# Patient Record
Sex: FEMALE | Race: WHITE | NOT HISPANIC OR LATINO | Employment: FULL TIME | URBAN - METROPOLITAN AREA
[De-identification: names, ages, dates, MRNs, and addresses within clinical notes are randomized per-mention and may not be internally consistent; named-entity substitution may affect disease eponyms.]

---

## 2017-01-10 ENCOUNTER — COMMUNICATION - HEALTHEAST (OUTPATIENT)
Dept: RHEUMATOLOGY | Facility: CLINIC | Age: 47
End: 2017-01-10

## 2017-01-10 DIAGNOSIS — M06.9 RHEUMATOID ARTHRITIS INVOLVING MULTIPLE JOINTS (H): ICD-10-CM

## 2017-02-04 ENCOUNTER — COMMUNICATION - HEALTHEAST (OUTPATIENT)
Dept: RHEUMATOLOGY | Facility: CLINIC | Age: 47
End: 2017-02-04

## 2017-02-04 DIAGNOSIS — M06.9 RHEUMATOID ARTHRITIS INVOLVING MULTIPLE JOINTS (H): ICD-10-CM

## 2017-02-04 DIAGNOSIS — M25.50 ARTHRALGIA: ICD-10-CM

## 2017-03-23 ENCOUNTER — AMBULATORY - HEALTHEAST (OUTPATIENT)
Dept: LAB | Facility: CLINIC | Age: 47
End: 2017-03-23

## 2017-03-23 DIAGNOSIS — Z79.899 HIGH RISK MEDICATION USE: ICD-10-CM

## 2017-03-23 LAB
ALT SERPL W P-5'-P-CCNC: 42 U/L (ref 0–45)
CREAT SERPL-MCNC: 0.72 MG/DL (ref 0.6–1.1)
GFR SERPL CREATININE-BSD FRML MDRD: >60 ML/MIN/1.73M2

## 2017-03-26 ENCOUNTER — COMMUNICATION - HEALTHEAST (OUTPATIENT)
Dept: RHEUMATOLOGY | Facility: CLINIC | Age: 47
End: 2017-03-26

## 2017-03-27 ENCOUNTER — OFFICE VISIT - HEALTHEAST (OUTPATIENT)
Dept: RHEUMATOLOGY | Facility: CLINIC | Age: 47
End: 2017-03-27

## 2017-03-27 DIAGNOSIS — M25.50 ARTHRALGIA: ICD-10-CM

## 2017-03-27 DIAGNOSIS — M06.4 INFLAMMATORY POLYARTHRITIS (H): ICD-10-CM

## 2017-03-27 DIAGNOSIS — Z79.899 HIGH RISK MEDICATION USE: ICD-10-CM

## 2017-03-27 ASSESSMENT — MIFFLIN-ST. JEOR: SCORE: 1581.34

## 2017-06-14 ENCOUNTER — COMMUNICATION - HEALTHEAST (OUTPATIENT)
Dept: RHEUMATOLOGY | Facility: CLINIC | Age: 47
End: 2017-06-14

## 2017-06-20 ENCOUNTER — COMMUNICATION - HEALTHEAST (OUTPATIENT)
Dept: RHEUMATOLOGY | Facility: CLINIC | Age: 47
End: 2017-06-20

## 2017-06-20 DIAGNOSIS — M06.4 INFLAMMATORY POLYARTHRITIS (H): ICD-10-CM

## 2017-06-27 ENCOUNTER — AMBULATORY - HEALTHEAST (OUTPATIENT)
Dept: LAB | Facility: CLINIC | Age: 47
End: 2017-06-27

## 2017-06-27 DIAGNOSIS — Z79.899 HIGH RISK MEDICATION USE: ICD-10-CM

## 2017-06-27 LAB
ALT SERPL W P-5'-P-CCNC: 22 U/L (ref 0–45)
CREAT SERPL-MCNC: 0.76 MG/DL (ref 0.6–1.1)
GFR SERPL CREATININE-BSD FRML MDRD: >60 ML/MIN/1.73M2

## 2017-07-04 ENCOUNTER — COMMUNICATION - HEALTHEAST (OUTPATIENT)
Dept: RHEUMATOLOGY | Facility: CLINIC | Age: 47
End: 2017-07-04

## 2017-07-05 ENCOUNTER — OFFICE VISIT - HEALTHEAST (OUTPATIENT)
Dept: RHEUMATOLOGY | Facility: CLINIC | Age: 47
End: 2017-07-05

## 2017-07-05 ENCOUNTER — COMMUNICATION - HEALTHEAST (OUTPATIENT)
Dept: ADMINISTRATIVE | Facility: CLINIC | Age: 47
End: 2017-07-05

## 2017-07-05 DIAGNOSIS — Z79.899 HIGH RISK MEDICATION USE: ICD-10-CM

## 2017-07-05 DIAGNOSIS — M54.40 ACUTE BILATERAL LOW BACK PAIN WITH SCIATICA: ICD-10-CM

## 2017-07-05 DIAGNOSIS — M06.4 INFLAMMATORY POLYARTHRITIS (H): ICD-10-CM

## 2017-07-05 ASSESSMENT — MIFFLIN-ST. JEOR: SCORE: 1580.43

## 2017-08-07 ENCOUNTER — COMMUNICATION - HEALTHEAST (OUTPATIENT)
Dept: RHEUMATOLOGY | Facility: CLINIC | Age: 47
End: 2017-08-07

## 2017-08-07 DIAGNOSIS — M06.4 INFLAMMATORY POLYARTHRITIS (H): ICD-10-CM

## 2017-08-28 ENCOUNTER — AMBULATORY - HEALTHEAST (OUTPATIENT)
Dept: LAB | Facility: CLINIC | Age: 47
End: 2017-08-28

## 2017-08-28 DIAGNOSIS — Z79.899 HIGH RISK MEDICATION USE: ICD-10-CM

## 2017-08-28 LAB
ALT SERPL W P-5'-P-CCNC: 45 U/L (ref 0–45)
CREAT SERPL-MCNC: 0.74 MG/DL (ref 0.6–1.1)
GFR SERPL CREATININE-BSD FRML MDRD: >60 ML/MIN/1.73M2

## 2017-08-30 ENCOUNTER — OFFICE VISIT - HEALTHEAST (OUTPATIENT)
Dept: RHEUMATOLOGY | Facility: CLINIC | Age: 47
End: 2017-08-30

## 2017-08-30 ENCOUNTER — RECORDS - HEALTHEAST (OUTPATIENT)
Dept: GENERAL RADIOLOGY | Age: 47
End: 2017-08-30

## 2017-08-30 DIAGNOSIS — Z84.0 FAMILY HISTORY OF PSORIASIS IN SISTER: ICD-10-CM

## 2017-08-30 DIAGNOSIS — M06.4 INFLAMMATORY POLYARTHRITIS (H): ICD-10-CM

## 2017-08-30 DIAGNOSIS — Z84.0 FAMILY HISTORY OF DISEASES OF THE SKIN AND SUBCUTANEOUS TISSUE: ICD-10-CM

## 2017-08-30 DIAGNOSIS — Z79.899 HIGH RISK MEDICATION USE: ICD-10-CM

## 2017-08-30 DIAGNOSIS — M70.61 TROCHANTERIC BURSITIS OF BOTH HIPS: ICD-10-CM

## 2017-08-30 DIAGNOSIS — M70.62 TROCHANTERIC BURSITIS OF BOTH HIPS: ICD-10-CM

## 2017-08-30 DIAGNOSIS — M06.4 INFLAMMATORY POLYARTHROPATHY (H): ICD-10-CM

## 2017-08-30 ASSESSMENT — MIFFLIN-ST. JEOR: SCORE: 1584.97

## 2017-08-31 LAB
ANA SER QL: 0.5 U
HBV SURFACE AG SERPL QL IA: NEGATIVE
HCV AB SERPL QL IA: NEGATIVE

## 2017-09-07 LAB
HLA-B27 RESULT - HISTORICAL: NEGATIVE
INTERPRETATION: NORMAL

## 2017-09-12 ENCOUNTER — COMMUNICATION - HEALTHEAST (OUTPATIENT)
Dept: RHEUMATOLOGY | Facility: CLINIC | Age: 47
End: 2017-09-12

## 2017-09-12 DIAGNOSIS — M06.4 INFLAMMATORY POLYARTHRITIS (H): ICD-10-CM

## 2017-09-20 ENCOUNTER — AMBULATORY - HEALTHEAST (OUTPATIENT)
Dept: LAB | Facility: CLINIC | Age: 47
End: 2017-09-20

## 2017-09-20 DIAGNOSIS — Z79.899 HIGH RISK MEDICATION USE: ICD-10-CM

## 2017-09-20 LAB
ALT SERPL W P-5'-P-CCNC: 20 U/L (ref 0–45)
CREAT SERPL-MCNC: 0.74 MG/DL (ref 0.6–1.1)
GFR SERPL CREATININE-BSD FRML MDRD: >60 ML/MIN/1.73M2

## 2017-09-29 ENCOUNTER — COMMUNICATION - HEALTHEAST (OUTPATIENT)
Dept: RHEUMATOLOGY | Facility: CLINIC | Age: 47
End: 2017-09-29

## 2017-09-29 DIAGNOSIS — M06.4 INFLAMMATORY POLYARTHRITIS (H): ICD-10-CM

## 2017-10-17 ENCOUNTER — OFFICE VISIT - HEALTHEAST (OUTPATIENT)
Dept: RHEUMATOLOGY | Facility: CLINIC | Age: 47
End: 2017-10-17

## 2017-10-17 DIAGNOSIS — Z79.899 HIGH RISK MEDICATION USE: ICD-10-CM

## 2017-10-17 DIAGNOSIS — G57.00 PIRIFORMIS SYNDROME, UNSPECIFIED LATERALITY: ICD-10-CM

## 2017-10-17 DIAGNOSIS — M06.4 INFLAMMATORY POLYARTHRITIS (H): ICD-10-CM

## 2017-10-17 ASSESSMENT — MIFFLIN-ST. JEOR: SCORE: 1580.43

## 2017-11-07 ENCOUNTER — OFFICE VISIT - HEALTHEAST (OUTPATIENT)
Dept: PHYSICAL THERAPY | Facility: REHABILITATION | Age: 47
End: 2017-11-07

## 2017-11-07 DIAGNOSIS — M67.951 TENDINOPATHY OF RIGHT GLUTEUS MEDIUS: ICD-10-CM

## 2017-11-07 DIAGNOSIS — M67.952 TENDINOPATHY OF LEFT GLUTEUS MEDIUS: ICD-10-CM

## 2017-11-22 ENCOUNTER — OFFICE VISIT - HEALTHEAST (OUTPATIENT)
Dept: PHYSICAL THERAPY | Facility: REHABILITATION | Age: 47
End: 2017-11-22

## 2017-11-22 DIAGNOSIS — M67.951 TENDINOPATHY OF RIGHT GLUTEUS MEDIUS: ICD-10-CM

## 2017-11-22 DIAGNOSIS — M67.952 TENDINOPATHY OF LEFT GLUTEUS MEDIUS: ICD-10-CM

## 2017-11-29 ENCOUNTER — OFFICE VISIT - HEALTHEAST (OUTPATIENT)
Dept: PHYSICAL THERAPY | Facility: REHABILITATION | Age: 47
End: 2017-11-29

## 2017-11-29 DIAGNOSIS — M67.951 TENDINOPATHY OF RIGHT GLUTEUS MEDIUS: ICD-10-CM

## 2017-11-29 DIAGNOSIS — M67.952 TENDINOPATHY OF LEFT GLUTEUS MEDIUS: ICD-10-CM

## 2017-12-05 ENCOUNTER — OFFICE VISIT - HEALTHEAST (OUTPATIENT)
Dept: PHYSICAL THERAPY | Facility: REHABILITATION | Age: 47
End: 2017-12-05

## 2017-12-05 ENCOUNTER — AMBULATORY - HEALTHEAST (OUTPATIENT)
Dept: RHEUMATOLOGY | Facility: CLINIC | Age: 47
End: 2017-12-05

## 2017-12-05 ENCOUNTER — COMMUNICATION - HEALTHEAST (OUTPATIENT)
Dept: RHEUMATOLOGY | Facility: CLINIC | Age: 47
End: 2017-12-05

## 2017-12-05 DIAGNOSIS — M67.952 TENDINOPATHY OF LEFT GLUTEUS MEDIUS: ICD-10-CM

## 2017-12-05 DIAGNOSIS — M06.4 INFLAMMATORY POLYARTHRITIS (H): ICD-10-CM

## 2017-12-05 DIAGNOSIS — M67.951 TENDINOPATHY OF RIGHT GLUTEUS MEDIUS: ICD-10-CM

## 2017-12-12 ENCOUNTER — OFFICE VISIT - HEALTHEAST (OUTPATIENT)
Dept: PHYSICAL THERAPY | Facility: REHABILITATION | Age: 47
End: 2017-12-12

## 2017-12-12 DIAGNOSIS — M67.952 TENDINOPATHY OF LEFT GLUTEUS MEDIUS: ICD-10-CM

## 2017-12-12 DIAGNOSIS — M67.951 TENDINOPATHY OF RIGHT GLUTEUS MEDIUS: ICD-10-CM

## 2017-12-21 ENCOUNTER — RECORDS - HEALTHEAST (OUTPATIENT)
Dept: LAB | Facility: CLINIC | Age: 47
End: 2017-12-21

## 2017-12-21 LAB
CHOLEST SERPL-MCNC: 153 MG/DL
FASTING STATUS PATIENT QL REPORTED: YES
HDLC SERPL-MCNC: 49 MG/DL
LDLC SERPL CALC-MCNC: 87 MG/DL
TRIGL SERPL-MCNC: 86 MG/DL

## 2017-12-29 ENCOUNTER — COMMUNICATION - HEALTHEAST (OUTPATIENT)
Dept: RHEUMATOLOGY | Facility: CLINIC | Age: 47
End: 2017-12-29

## 2017-12-29 ENCOUNTER — AMBULATORY - HEALTHEAST (OUTPATIENT)
Dept: LAB | Facility: CLINIC | Age: 47
End: 2017-12-29

## 2017-12-29 DIAGNOSIS — M06.4 INFLAMMATORY POLYARTHRITIS (H): ICD-10-CM

## 2017-12-29 LAB
ALT SERPL W P-5'-P-CCNC: 27 U/L (ref 0–45)
CREAT SERPL-MCNC: 0.72 MG/DL (ref 0.6–1.1)
GFR SERPL CREATININE-BSD FRML MDRD: >60 ML/MIN/1.73M2

## 2018-01-02 ENCOUNTER — COMMUNICATION - HEALTHEAST (OUTPATIENT)
Dept: RHEUMATOLOGY | Facility: CLINIC | Age: 48
End: 2018-01-02

## 2018-01-04 ENCOUNTER — COMMUNICATION - HEALTHEAST (OUTPATIENT)
Dept: RHEUMATOLOGY | Facility: CLINIC | Age: 48
End: 2018-01-04

## 2018-01-05 ENCOUNTER — OFFICE VISIT - HEALTHEAST (OUTPATIENT)
Dept: RHEUMATOLOGY | Facility: CLINIC | Age: 48
End: 2018-01-05

## 2018-01-05 DIAGNOSIS — Z84.0 FAMILY HISTORY OF PSORIASIS IN SISTER: ICD-10-CM

## 2018-01-05 DIAGNOSIS — G57.00 PIRIFORMIS SYNDROME, UNSPECIFIED LATERALITY: ICD-10-CM

## 2018-01-05 DIAGNOSIS — M06.4 INFLAMMATORY POLYARTHRITIS (H): ICD-10-CM

## 2018-01-05 ASSESSMENT — MIFFLIN-ST. JEOR: SCORE: 1580.43

## 2018-01-09 ENCOUNTER — COMMUNICATION - HEALTHEAST (OUTPATIENT)
Dept: RHEUMATOLOGY | Facility: CLINIC | Age: 48
End: 2018-01-09

## 2018-01-12 ENCOUNTER — HOSPITAL ENCOUNTER (OUTPATIENT)
Dept: MRI IMAGING | Facility: CLINIC | Age: 48
Discharge: HOME | End: 2018-01-12
Attending: INTERNAL MEDICINE

## 2018-01-12 DIAGNOSIS — G57.00 PIRIFORMIS SYNDROME, UNSPECIFIED LATERALITY: ICD-10-CM

## 2018-01-15 ENCOUNTER — COMMUNICATION - HEALTHEAST (OUTPATIENT)
Dept: RHEUMATOLOGY | Facility: CLINIC | Age: 48
End: 2018-01-15

## 2018-01-29 ENCOUNTER — HOSPITAL ENCOUNTER (OUTPATIENT)
Dept: MAMMOGRAPHY | Facility: CLINIC | Age: 48
Discharge: HOME | End: 2018-01-29

## 2018-01-29 DIAGNOSIS — Z12.31 VISIT FOR SCREENING MAMMOGRAM: ICD-10-CM

## 2018-01-30 ENCOUNTER — COMMUNICATION - HEALTHEAST (OUTPATIENT)
Dept: MAMMOGRAPHY | Facility: CLINIC | Age: 48
End: 2018-01-30

## 2018-01-31 ENCOUNTER — RECORDS - HEALTHEAST (OUTPATIENT)
Dept: ADMINISTRATIVE | Facility: OTHER | Age: 48
End: 2018-01-31

## 2018-02-05 ENCOUNTER — HOSPITAL ENCOUNTER (OUTPATIENT)
Dept: ULTRASOUND IMAGING | Facility: CLINIC | Age: 48
Discharge: HOME | End: 2018-02-05

## 2018-02-05 ENCOUNTER — HOSPITAL ENCOUNTER (OUTPATIENT)
Dept: MAMMOGRAPHY | Facility: CLINIC | Age: 48
Discharge: HOME | End: 2018-02-05

## 2018-02-05 DIAGNOSIS — N64.89 BREAST ASYMMETRY: ICD-10-CM

## 2018-02-19 ENCOUNTER — OFFICE VISIT - HEALTHEAST (OUTPATIENT)
Dept: RHEUMATOLOGY | Facility: CLINIC | Age: 48
End: 2018-02-19

## 2018-02-19 DIAGNOSIS — M06.4 INFLAMMATORY POLYARTHRITIS (H): ICD-10-CM

## 2018-02-19 DIAGNOSIS — Z84.0 FAMILY HISTORY OF PSORIASIS IN SISTER: ICD-10-CM

## 2018-02-19 DIAGNOSIS — Z79.899 HIGH RISK MEDICATION USE: ICD-10-CM

## 2018-02-19 LAB
ALBUMIN SERPL-MCNC: 3.4 G/DL (ref 3.5–5)
ALT SERPL W P-5'-P-CCNC: 28 U/L (ref 0–45)
CREAT SERPL-MCNC: 0.63 MG/DL (ref 0.6–1.1)
ERYTHROCYTE [DISTWIDTH] IN BLOOD BY AUTOMATED COUNT: 12.8 % (ref 11–14.5)
GFR SERPL CREATININE-BSD FRML MDRD: >60 ML/MIN/1.73M2
HCT VFR BLD AUTO: 36 % (ref 35–47)
HGB BLD-MCNC: 12.4 G/DL (ref 12–16)
MCH RBC QN AUTO: 30.3 PG (ref 27–34)
MCHC RBC AUTO-ENTMCNC: 34.4 G/DL (ref 32–36)
MCV RBC AUTO: 88 FL (ref 80–100)
PLATELET # BLD AUTO: 266 THOU/UL (ref 140–440)
PMV BLD AUTO: 6.9 FL (ref 7–10)
RBC # BLD AUTO: 4.1 MILL/UL (ref 3.8–5.4)
WBC: 7.5 THOU/UL (ref 4–11)

## 2018-02-19 ASSESSMENT — MIFFLIN-ST. JEOR: SCORE: 1580.43

## 2018-03-02 ENCOUNTER — COMMUNICATION - HEALTHEAST (OUTPATIENT)
Dept: RHEUMATOLOGY | Facility: CLINIC | Age: 48
End: 2018-03-02

## 2018-03-02 DIAGNOSIS — M06.4 INFLAMMATORY POLYARTHRITIS (H): ICD-10-CM

## 2018-03-14 ENCOUNTER — COMMUNICATION - HEALTHEAST (OUTPATIENT)
Dept: RHEUMATOLOGY | Facility: CLINIC | Age: 48
End: 2018-03-14

## 2018-03-19 ENCOUNTER — OFFICE VISIT - HEALTHEAST (OUTPATIENT)
Dept: RHEUMATOLOGY | Facility: CLINIC | Age: 48
End: 2018-03-19

## 2018-03-19 DIAGNOSIS — M06.4 INFLAMMATORY POLYARTHRITIS (H): ICD-10-CM

## 2018-03-19 DIAGNOSIS — M70.61 TROCHANTERIC BURSITIS OF BOTH HIPS: ICD-10-CM

## 2018-03-19 DIAGNOSIS — M70.62 TROCHANTERIC BURSITIS OF BOTH HIPS: ICD-10-CM

## 2018-03-19 DIAGNOSIS — Z84.0 FAMILY HISTORY OF PSORIASIS IN SISTER: ICD-10-CM

## 2018-03-19 RX ORDER — ERGOCALCIFEROL 1.25 MG/1
2000 CAPSULE ORAL DAILY
Status: SHIPPED | COMMUNITY
Start: 2018-03-19 | End: 2024-04-18

## 2018-03-19 ASSESSMENT — MIFFLIN-ST. JEOR: SCORE: 1580.43

## 2018-03-22 ENCOUNTER — COMMUNICATION - HEALTHEAST (OUTPATIENT)
Dept: RHEUMATOLOGY | Facility: CLINIC | Age: 48
End: 2018-03-22

## 2018-03-28 ENCOUNTER — COMMUNICATION - HEALTHEAST (OUTPATIENT)
Dept: RHEUMATOLOGY | Facility: CLINIC | Age: 48
End: 2018-03-28

## 2018-03-28 DIAGNOSIS — M06.4 INFLAMMATORY POLYARTHRITIS (H): ICD-10-CM

## 2018-05-17 ENCOUNTER — AMBULATORY - HEALTHEAST (OUTPATIENT)
Dept: LAB | Facility: CLINIC | Age: 48
End: 2018-05-17

## 2018-05-17 DIAGNOSIS — M06.4 INFLAMMATORY POLYARTHRITIS (H): ICD-10-CM

## 2018-05-17 LAB
ALBUMIN SERPL-MCNC: 3.5 G/DL (ref 3.5–5)
ALT SERPL W P-5'-P-CCNC: 19 U/L (ref 0–45)
CREAT SERPL-MCNC: 0.7 MG/DL (ref 0.6–1.1)
ERYTHROCYTE [DISTWIDTH] IN BLOOD BY AUTOMATED COUNT: 12.3 % (ref 11–14.5)
GFR SERPL CREATININE-BSD FRML MDRD: >60 ML/MIN/1.73M2
HCT VFR BLD AUTO: 35.8 % (ref 35–47)
HGB BLD-MCNC: 12 G/DL (ref 12–16)
MCH RBC QN AUTO: 29.7 PG (ref 27–34)
MCHC RBC AUTO-ENTMCNC: 33.4 G/DL (ref 32–36)
MCV RBC AUTO: 89 FL (ref 80–100)
PLATELET # BLD AUTO: 308 THOU/UL (ref 140–440)
PMV BLD AUTO: 7 FL (ref 7–10)
RBC # BLD AUTO: 4.02 MILL/UL (ref 3.8–5.4)
WBC: 9.1 THOU/UL (ref 4–11)

## 2018-05-22 ENCOUNTER — OFFICE VISIT - HEALTHEAST (OUTPATIENT)
Dept: RHEUMATOLOGY | Facility: CLINIC | Age: 48
End: 2018-05-22

## 2018-05-22 DIAGNOSIS — Z79.899 HIGH RISK MEDICATION USE: ICD-10-CM

## 2018-05-22 DIAGNOSIS — Z84.0 FAMILY HISTORY OF PSORIASIS IN SISTER: ICD-10-CM

## 2018-05-22 DIAGNOSIS — M70.61 TROCHANTERIC BURSITIS OF BOTH HIPS: ICD-10-CM

## 2018-05-22 DIAGNOSIS — L40.50 PSORIATIC ARTHRITIS (H): ICD-10-CM

## 2018-05-22 DIAGNOSIS — M77.9 ENTHESITIS: ICD-10-CM

## 2018-05-22 DIAGNOSIS — M70.62 TROCHANTERIC BURSITIS OF BOTH HIPS: ICD-10-CM

## 2018-05-25 ENCOUNTER — COMMUNICATION - HEALTHEAST (OUTPATIENT)
Dept: ADMINISTRATIVE | Facility: CLINIC | Age: 48
End: 2018-05-25

## 2018-05-28 ENCOUNTER — COMMUNICATION - HEALTHEAST (OUTPATIENT)
Dept: RHEUMATOLOGY | Facility: CLINIC | Age: 48
End: 2018-05-28

## 2018-05-28 DIAGNOSIS — M06.4 INFLAMMATORY POLYARTHRITIS (H): ICD-10-CM

## 2018-05-31 ENCOUNTER — COMMUNICATION - HEALTHEAST (OUTPATIENT)
Dept: RHEUMATOLOGY | Facility: CLINIC | Age: 48
End: 2018-05-31

## 2018-06-07 ENCOUNTER — COMMUNICATION - HEALTHEAST (OUTPATIENT)
Dept: RHEUMATOLOGY | Facility: CLINIC | Age: 48
End: 2018-06-07

## 2018-06-29 ENCOUNTER — COMMUNICATION - HEALTHEAST (OUTPATIENT)
Dept: ADMINISTRATIVE | Facility: CLINIC | Age: 48
End: 2018-06-29

## 2018-07-02 ENCOUNTER — COMMUNICATION - HEALTHEAST (OUTPATIENT)
Dept: ADMINISTRATIVE | Facility: CLINIC | Age: 48
End: 2018-07-02

## 2018-07-31 ENCOUNTER — COMMUNICATION - HEALTHEAST (OUTPATIENT)
Dept: RHEUMATOLOGY | Facility: CLINIC | Age: 48
End: 2018-07-31

## 2018-08-02 ENCOUNTER — COMMUNICATION - HEALTHEAST (OUTPATIENT)
Dept: RHEUMATOLOGY | Facility: CLINIC | Age: 48
End: 2018-08-02

## 2018-08-02 DIAGNOSIS — M06.4 INFLAMMATORY POLYARTHRITIS (H): ICD-10-CM

## 2018-08-21 ENCOUNTER — COMMUNICATION - HEALTHEAST (OUTPATIENT)
Dept: RHEUMATOLOGY | Facility: CLINIC | Age: 48
End: 2018-08-21

## 2018-08-21 DIAGNOSIS — M06.4 INFLAMMATORY POLYARTHRITIS (H): ICD-10-CM

## 2018-08-22 ENCOUNTER — AMBULATORY - HEALTHEAST (OUTPATIENT)
Dept: LAB | Facility: CLINIC | Age: 48
End: 2018-08-22

## 2018-08-22 DIAGNOSIS — M06.4 INFLAMMATORY POLYARTHRITIS (H): ICD-10-CM

## 2018-08-22 LAB
ALBUMIN SERPL-MCNC: 3.8 G/DL (ref 3.5–5)
ALT SERPL W P-5'-P-CCNC: 30 U/L (ref 0–45)
CREAT SERPL-MCNC: 0.69 MG/DL (ref 0.6–1.1)
ERYTHROCYTE [DISTWIDTH] IN BLOOD BY AUTOMATED COUNT: 12.5 % (ref 11–14.5)
GFR SERPL CREATININE-BSD FRML MDRD: >60 ML/MIN/1.73M2
HCT VFR BLD AUTO: 36.3 % (ref 35–47)
HGB BLD-MCNC: 12.4 G/DL (ref 12–16)
MCH RBC QN AUTO: 30.8 PG (ref 27–34)
MCHC RBC AUTO-ENTMCNC: 34.2 G/DL (ref 32–36)
MCV RBC AUTO: 90 FL (ref 80–100)
PLATELET # BLD AUTO: 295 THOU/UL (ref 140–440)
PMV BLD AUTO: 7 FL (ref 7–10)
RBC # BLD AUTO: 4.02 MILL/UL (ref 3.8–5.4)
WBC: 6.6 THOU/UL (ref 4–11)

## 2018-08-29 ENCOUNTER — OFFICE VISIT - HEALTHEAST (OUTPATIENT)
Dept: RHEUMATOLOGY | Facility: CLINIC | Age: 48
End: 2018-08-29

## 2018-08-29 DIAGNOSIS — Z79.899 HIGH RISK MEDICATION USE: ICD-10-CM

## 2018-08-29 DIAGNOSIS — Z84.0 FAMILY HISTORY OF PSORIASIS IN SISTER: ICD-10-CM

## 2018-08-29 DIAGNOSIS — L40.50 PSORIATIC ARTHRITIS (H): ICD-10-CM

## 2018-08-29 DIAGNOSIS — M77.9 ENTHESITIS: ICD-10-CM

## 2018-09-24 ENCOUNTER — COMMUNICATION - HEALTHEAST (OUTPATIENT)
Dept: RHEUMATOLOGY | Facility: CLINIC | Age: 48
End: 2018-09-24

## 2018-09-24 DIAGNOSIS — M06.4 INFLAMMATORY POLYARTHRITIS (H): ICD-10-CM

## 2018-10-02 ENCOUNTER — COMMUNICATION - HEALTHEAST (OUTPATIENT)
Dept: RHEUMATOLOGY | Facility: CLINIC | Age: 48
End: 2018-10-02

## 2018-10-02 DIAGNOSIS — L40.50 PSORIATIC ARTHRITIS (H): ICD-10-CM

## 2018-11-14 ENCOUNTER — COMMUNICATION - HEALTHEAST (OUTPATIENT)
Dept: RHEUMATOLOGY | Facility: CLINIC | Age: 48
End: 2018-11-14

## 2018-11-14 DIAGNOSIS — M06.4 INFLAMMATORY POLYARTHRITIS (H): ICD-10-CM

## 2018-11-14 DIAGNOSIS — L40.50 PSORIATIC ARTHRITIS (H): ICD-10-CM

## 2018-11-27 ENCOUNTER — AMBULATORY - HEALTHEAST (OUTPATIENT)
Dept: LAB | Facility: CLINIC | Age: 48
End: 2018-11-27

## 2018-11-27 DIAGNOSIS — L40.50 PSORIATIC ARTHRITIS (H): ICD-10-CM

## 2018-11-27 LAB
ALBUMIN SERPL-MCNC: 3.8 G/DL (ref 3.5–5)
ALT SERPL W P-5'-P-CCNC: 44 U/L (ref 0–45)
CREAT SERPL-MCNC: 0.69 MG/DL (ref 0.6–1.1)
ERYTHROCYTE [DISTWIDTH] IN BLOOD BY AUTOMATED COUNT: 12.1 % (ref 11–14.5)
GFR SERPL CREATININE-BSD FRML MDRD: >60 ML/MIN/1.73M2
HCT VFR BLD AUTO: 37.5 % (ref 35–47)
HGB BLD-MCNC: 12.6 G/DL (ref 12–16)
MCH RBC QN AUTO: 30.1 PG (ref 27–34)
MCHC RBC AUTO-ENTMCNC: 33.7 G/DL (ref 32–36)
MCV RBC AUTO: 89 FL (ref 80–100)
PLATELET # BLD AUTO: 293 THOU/UL (ref 140–440)
PMV BLD AUTO: 6.7 FL (ref 7–10)
RBC # BLD AUTO: 4.19 MILL/UL (ref 3.8–5.4)
WBC: 6.5 THOU/UL (ref 4–11)

## 2018-12-05 ENCOUNTER — OFFICE VISIT - HEALTHEAST (OUTPATIENT)
Dept: RHEUMATOLOGY | Facility: CLINIC | Age: 48
End: 2018-12-05

## 2018-12-05 DIAGNOSIS — Z84.0 FAMILY HISTORY OF PSORIASIS IN SISTER: ICD-10-CM

## 2018-12-05 DIAGNOSIS — L40.50 PSORIATIC ARTHRITIS (H): ICD-10-CM

## 2018-12-05 DIAGNOSIS — M77.9 ENTHESITIS: ICD-10-CM

## 2018-12-05 DIAGNOSIS — Z79.899 HIGH RISK MEDICATION USE: ICD-10-CM

## 2019-02-06 ENCOUNTER — COMMUNICATION - HEALTHEAST (OUTPATIENT)
Dept: RHEUMATOLOGY | Facility: CLINIC | Age: 49
End: 2019-02-06

## 2019-02-06 DIAGNOSIS — M06.4 INFLAMMATORY POLYARTHRITIS (H): ICD-10-CM

## 2019-02-06 DIAGNOSIS — L40.50 PSORIATIC ARTHRITIS (H): ICD-10-CM

## 2019-02-12 ENCOUNTER — AMBULATORY - HEALTHEAST (OUTPATIENT)
Dept: LAB | Facility: CLINIC | Age: 49
End: 2019-02-12

## 2019-02-12 DIAGNOSIS — L40.50 PSORIATIC ARTHRITIS (H): ICD-10-CM

## 2019-02-12 LAB
ERYTHROCYTE [DISTWIDTH] IN BLOOD BY AUTOMATED COUNT: 12 % (ref 11–14.5)
HCT VFR BLD AUTO: 37.2 % (ref 35–47)
HGB BLD-MCNC: 12.4 G/DL (ref 12–16)
MCH RBC QN AUTO: 29.5 PG (ref 27–34)
MCHC RBC AUTO-ENTMCNC: 33.2 G/DL (ref 32–36)
MCV RBC AUTO: 89 FL (ref 80–100)
PLATELET # BLD AUTO: 305 THOU/UL (ref 140–440)
PMV BLD AUTO: 6.9 FL (ref 7–10)
RBC # BLD AUTO: 4.2 MILL/UL (ref 3.8–5.4)
WBC: 5.8 THOU/UL (ref 4–11)

## 2019-02-13 LAB
ALBUMIN SERPL-MCNC: 3.9 G/DL (ref 3.5–5)
ALT SERPL W P-5'-P-CCNC: 30 U/L (ref 0–45)
CREAT SERPL-MCNC: 0.67 MG/DL (ref 0.6–1.1)
GFR SERPL CREATININE-BSD FRML MDRD: >60 ML/MIN/1.73M2

## 2019-05-08 ENCOUNTER — COMMUNICATION - HEALTHEAST (OUTPATIENT)
Dept: RHEUMATOLOGY | Facility: CLINIC | Age: 49
End: 2019-05-08

## 2019-05-08 DIAGNOSIS — L40.50 PSORIATIC ARTHRITIS (H): ICD-10-CM

## 2019-05-08 DIAGNOSIS — M06.4 INFLAMMATORY POLYARTHRITIS (H): ICD-10-CM

## 2019-05-15 ENCOUNTER — AMBULATORY - HEALTHEAST (OUTPATIENT)
Dept: LAB | Facility: CLINIC | Age: 49
End: 2019-05-15

## 2019-05-15 DIAGNOSIS — L40.50 PSORIATIC ARTHRITIS (H): ICD-10-CM

## 2019-05-15 LAB
ALBUMIN SERPL-MCNC: 3.8 G/DL (ref 3.5–5)
ALT SERPL W P-5'-P-CCNC: 20 U/L (ref 0–45)
CREAT SERPL-MCNC: 0.68 MG/DL (ref 0.6–1.1)
ERYTHROCYTE [DISTWIDTH] IN BLOOD BY AUTOMATED COUNT: 12.8 % (ref 11–14.5)
GFR SERPL CREATININE-BSD FRML MDRD: >60 ML/MIN/1.73M2
HCT VFR BLD AUTO: 38.3 % (ref 35–47)
HGB BLD-MCNC: 12.7 G/DL (ref 12–16)
MCH RBC QN AUTO: 29.3 PG (ref 27–34)
MCHC RBC AUTO-ENTMCNC: 33.2 G/DL (ref 32–36)
MCV RBC AUTO: 88 FL (ref 80–100)
PLATELET # BLD AUTO: 293 THOU/UL (ref 140–440)
PMV BLD AUTO: 7 FL (ref 7–10)
RBC # BLD AUTO: 4.34 MILL/UL (ref 3.8–5.4)
WBC: 5.9 THOU/UL (ref 4–11)

## 2019-05-20 ENCOUNTER — OFFICE VISIT - HEALTHEAST (OUTPATIENT)
Dept: RHEUMATOLOGY | Facility: CLINIC | Age: 49
End: 2019-05-20

## 2019-05-20 DIAGNOSIS — L40.50 PSORIATIC ARTHRITIS (H): ICD-10-CM

## 2019-05-20 DIAGNOSIS — Z84.0 FAMILY HISTORY OF PSORIASIS IN SISTER: ICD-10-CM

## 2019-05-20 DIAGNOSIS — Z79.899 HIGH RISK MEDICATION USE: ICD-10-CM

## 2019-05-20 DIAGNOSIS — M77.9 ENTHESITIS: ICD-10-CM

## 2019-05-20 DIAGNOSIS — M19.079 1ST MTP ARTHRITIS: ICD-10-CM

## 2019-05-21 ENCOUNTER — COMMUNICATION - HEALTHEAST (OUTPATIENT)
Dept: RHEUMATOLOGY | Facility: CLINIC | Age: 49
End: 2019-05-21

## 2019-06-04 ENCOUNTER — RECORDS - HEALTHEAST (OUTPATIENT)
Dept: ADMINISTRATIVE | Facility: OTHER | Age: 49
End: 2019-06-04

## 2019-06-04 LAB
LAB AP CHARGES (HE HISTORICAL CONVERSION): NORMAL
PATH REPORT.COMMENTS IMP SPEC: NORMAL
PATH REPORT.COMMENTS IMP SPEC: NORMAL
PATH REPORT.FINAL DX SPEC: NORMAL
PATH REPORT.GROSS SPEC: NORMAL
PATH REPORT.MICROSCOPIC SPEC OTHER STN: NORMAL
PATH REPORT.RELEVANT HX SPEC: NORMAL
RESULT FLAG (HE HISTORICAL CONVERSION): NORMAL

## 2019-06-27 ENCOUNTER — COMMUNICATION - HEALTHEAST (OUTPATIENT)
Dept: RHEUMATOLOGY | Facility: CLINIC | Age: 49
End: 2019-06-27

## 2019-06-27 DIAGNOSIS — M77.9 ENTHESITIS: ICD-10-CM

## 2019-06-27 DIAGNOSIS — L40.50 PSORIATIC ARTHRITIS (H): ICD-10-CM

## 2019-07-29 ENCOUNTER — COMMUNICATION - HEALTHEAST (OUTPATIENT)
Dept: RHEUMATOLOGY | Facility: CLINIC | Age: 49
End: 2019-07-29

## 2019-07-29 DIAGNOSIS — M06.4 INFLAMMATORY POLYARTHRITIS (H): ICD-10-CM

## 2019-07-30 ENCOUNTER — COMMUNICATION - HEALTHEAST (OUTPATIENT)
Dept: RHEUMATOLOGY | Facility: CLINIC | Age: 49
End: 2019-07-30

## 2019-07-30 DIAGNOSIS — M06.4 INFLAMMATORY POLYARTHRITIS (H): ICD-10-CM

## 2019-07-30 DIAGNOSIS — L40.50 PSORIATIC ARTHRITIS (H): ICD-10-CM

## 2019-08-07 ENCOUNTER — AMBULATORY - HEALTHEAST (OUTPATIENT)
Dept: LAB | Facility: CLINIC | Age: 49
End: 2019-08-07

## 2019-08-07 DIAGNOSIS — L40.50 PSORIATIC ARTHRITIS (H): ICD-10-CM

## 2019-08-07 LAB
ALBUMIN SERPL-MCNC: 3.9 G/DL (ref 3.5–5)
ALT SERPL W P-5'-P-CCNC: 25 U/L (ref 0–45)
CREAT SERPL-MCNC: 0.68 MG/DL (ref 0.6–1.1)
ERYTHROCYTE [DISTWIDTH] IN BLOOD BY AUTOMATED COUNT: 12.5 % (ref 11–14.5)
GFR SERPL CREATININE-BSD FRML MDRD: >60 ML/MIN/1.73M2
HCT VFR BLD AUTO: 39.8 % (ref 35–47)
HGB BLD-MCNC: 13 G/DL (ref 12–16)
MCH RBC QN AUTO: 28.8 PG (ref 27–34)
MCHC RBC AUTO-ENTMCNC: 32.6 G/DL (ref 32–36)
MCV RBC AUTO: 88 FL (ref 80–100)
PLATELET # BLD AUTO: 320 THOU/UL (ref 140–440)
PMV BLD AUTO: 7 FL (ref 7–10)
RBC # BLD AUTO: 4.52 MILL/UL (ref 3.8–5.4)
WBC: 5.3 THOU/UL (ref 4–11)

## 2019-09-09 ENCOUNTER — COMMUNICATION - HEALTHEAST (OUTPATIENT)
Dept: RHEUMATOLOGY | Facility: CLINIC | Age: 49
End: 2019-09-09

## 2019-09-09 DIAGNOSIS — M77.9 ENTHESITIS: ICD-10-CM

## 2019-09-09 DIAGNOSIS — L40.50 PSORIATIC ARTHRITIS (H): ICD-10-CM

## 2019-10-23 ENCOUNTER — AMBULATORY - HEALTHEAST (OUTPATIENT)
Dept: LAB | Facility: CLINIC | Age: 49
End: 2019-10-23

## 2019-10-23 ENCOUNTER — COMMUNICATION - HEALTHEAST (OUTPATIENT)
Dept: RHEUMATOLOGY | Facility: CLINIC | Age: 49
End: 2019-10-23

## 2019-10-23 DIAGNOSIS — L40.50 PSORIATIC ARTHRITIS (H): ICD-10-CM

## 2019-10-23 DIAGNOSIS — M06.4 INFLAMMATORY POLYARTHRITIS (H): ICD-10-CM

## 2019-10-23 LAB
ALBUMIN SERPL-MCNC: 3.8 G/DL (ref 3.5–5)
ALT SERPL W P-5'-P-CCNC: 25 U/L (ref 0–45)
CREAT SERPL-MCNC: 0.75 MG/DL (ref 0.6–1.1)
ERYTHROCYTE [DISTWIDTH] IN BLOOD BY AUTOMATED COUNT: 12.6 % (ref 11–14.5)
GFR SERPL CREATININE-BSD FRML MDRD: >60 ML/MIN/1.73M2
HCT VFR BLD AUTO: 38 % (ref 35–47)
HGB BLD-MCNC: 12.4 G/DL (ref 12–16)
MCH RBC QN AUTO: 29.2 PG (ref 27–34)
MCHC RBC AUTO-ENTMCNC: 32.7 G/DL (ref 32–36)
MCV RBC AUTO: 89 FL (ref 80–100)
PLATELET # BLD AUTO: 315 THOU/UL (ref 140–440)
PMV BLD AUTO: 7.2 FL (ref 7–10)
RBC # BLD AUTO: 4.26 MILL/UL (ref 3.8–5.4)
WBC: 6.8 THOU/UL (ref 4–11)

## 2019-10-29 ENCOUNTER — OFFICE VISIT - HEALTHEAST (OUTPATIENT)
Dept: RHEUMATOLOGY | Facility: CLINIC | Age: 49
End: 2019-10-29

## 2019-10-29 DIAGNOSIS — Z84.0 FAMILY HISTORY OF PSORIASIS IN SISTER: ICD-10-CM

## 2019-10-29 DIAGNOSIS — L40.50 PSORIATIC ARTHRITIS (H): ICD-10-CM

## 2019-10-29 DIAGNOSIS — M77.9 ENTHESITIS: ICD-10-CM

## 2019-10-29 DIAGNOSIS — Z79.899 HIGH RISK MEDICATION USE: ICD-10-CM

## 2019-10-29 DIAGNOSIS — M06.4 INFLAMMATORY POLYARTHRITIS (H): ICD-10-CM

## 2019-10-29 RX ORDER — ESTRADIOL 2 MG/1
2 TABLET ORAL DAILY
Status: SHIPPED | COMMUNITY
Start: 2019-08-30

## 2019-10-29 ASSESSMENT — MIFFLIN-ST. JEOR: SCORE: 1551.86

## 2019-11-09 ENCOUNTER — COMMUNICATION - HEALTHEAST (OUTPATIENT)
Dept: RHEUMATOLOGY | Facility: CLINIC | Age: 49
End: 2019-11-09

## 2019-11-12 ENCOUNTER — COMMUNICATION - HEALTHEAST (OUTPATIENT)
Dept: RHEUMATOLOGY | Facility: CLINIC | Age: 49
End: 2019-11-12

## 2019-11-12 DIAGNOSIS — M06.4 INFLAMMATORY POLYARTHRITIS (H): ICD-10-CM

## 2019-11-12 DIAGNOSIS — M77.9 ENTHESITIS: ICD-10-CM

## 2019-11-12 DIAGNOSIS — L40.50 PSORIATIC ARTHRITIS (H): ICD-10-CM

## 2020-01-03 ENCOUNTER — COMMUNICATION - HEALTHEAST (OUTPATIENT)
Dept: RHEUMATOLOGY | Facility: CLINIC | Age: 50
End: 2020-01-03

## 2020-01-03 DIAGNOSIS — L40.50 PSORIATIC ARTHRITIS (H): ICD-10-CM

## 2020-01-03 DIAGNOSIS — M77.9 ENTHESITIS: ICD-10-CM

## 2020-01-06 ENCOUNTER — COMMUNICATION - HEALTHEAST (OUTPATIENT)
Dept: RHEUMATOLOGY | Facility: CLINIC | Age: 50
End: 2020-01-06

## 2020-01-07 ENCOUNTER — HOSPITAL ENCOUNTER (OUTPATIENT)
Dept: MAMMOGRAPHY | Facility: CLINIC | Age: 50
Discharge: HOME | End: 2020-01-07

## 2020-01-07 ENCOUNTER — COMMUNICATION - HEALTHEAST (OUTPATIENT)
Dept: RHEUMATOLOGY | Facility: CLINIC | Age: 50
End: 2020-01-07

## 2020-01-07 DIAGNOSIS — M77.9 ENTHESITIS: ICD-10-CM

## 2020-01-07 DIAGNOSIS — Z12.31 VISIT FOR SCREENING MAMMOGRAM: ICD-10-CM

## 2020-01-07 DIAGNOSIS — L40.50 PSORIATIC ARTHRITIS (H): ICD-10-CM

## 2020-01-09 ENCOUNTER — COMMUNICATION - HEALTHEAST (OUTPATIENT)
Dept: LAB | Facility: CLINIC | Age: 50
End: 2020-01-09

## 2020-01-09 DIAGNOSIS — L40.50 PSORIATIC ARTHRITIS (H): ICD-10-CM

## 2020-01-10 ENCOUNTER — RECORDS - HEALTHEAST (OUTPATIENT)
Dept: ADMINISTRATIVE | Facility: OTHER | Age: 50
End: 2020-01-10

## 2020-01-15 ENCOUNTER — HOSPITAL ENCOUNTER (OUTPATIENT)
Dept: MAMMOGRAPHY | Facility: CLINIC | Age: 50
Discharge: HOME | End: 2020-01-15
Attending: FAMILY MEDICINE

## 2020-01-15 ENCOUNTER — HOSPITAL ENCOUNTER (OUTPATIENT)
Dept: ULTRASOUND IMAGING | Facility: CLINIC | Age: 50
Discharge: HOME | End: 2020-01-15
Attending: FAMILY MEDICINE

## 2020-01-15 DIAGNOSIS — N64.89 BREAST ASYMMETRY: ICD-10-CM

## 2020-01-27 ENCOUNTER — AMBULATORY - HEALTHEAST (OUTPATIENT)
Dept: LAB | Facility: CLINIC | Age: 50
End: 2020-01-27

## 2020-01-27 DIAGNOSIS — L40.50 PSORIATIC ARTHRITIS (H): ICD-10-CM

## 2020-01-27 LAB
ERYTHROCYTE [DISTWIDTH] IN BLOOD BY AUTOMATED COUNT: 12.6 % (ref 11–14.5)
HCT VFR BLD AUTO: 36.4 % (ref 35–47)
HGB BLD-MCNC: 12.1 G/DL (ref 12–16)
MCH RBC QN AUTO: 29.4 PG (ref 27–34)
MCHC RBC AUTO-ENTMCNC: 33.3 G/DL (ref 32–36)
MCV RBC AUTO: 88 FL (ref 80–100)
PLATELET # BLD AUTO: 306 THOU/UL (ref 140–440)
PMV BLD AUTO: 7 FL (ref 7–10)
RBC # BLD AUTO: 4.11 MILL/UL (ref 3.8–5.4)
WBC: 6 THOU/UL (ref 4–11)

## 2020-01-28 LAB
ALBUMIN SERPL-MCNC: 3.9 G/DL (ref 3.5–5)
ALT SERPL W P-5'-P-CCNC: 22 U/L (ref 0–45)
CREAT SERPL-MCNC: 0.7 MG/DL (ref 0.6–1.1)
GFR SERPL CREATININE-BSD FRML MDRD: >60 ML/MIN/1.73M2

## 2020-02-03 ENCOUNTER — COMMUNICATION - HEALTHEAST (OUTPATIENT)
Dept: RHEUMATOLOGY | Facility: CLINIC | Age: 50
End: 2020-02-03

## 2020-02-03 DIAGNOSIS — M77.9 ENTHESITIS: ICD-10-CM

## 2020-02-03 DIAGNOSIS — L40.50 PSORIATIC ARTHRITIS (H): ICD-10-CM

## 2020-02-03 DIAGNOSIS — M06.4 INFLAMMATORY POLYARTHRITIS (H): ICD-10-CM

## 2020-03-08 ENCOUNTER — COMMUNICATION - HEALTHEAST (OUTPATIENT)
Dept: RHEUMATOLOGY | Facility: CLINIC | Age: 50
End: 2020-03-08

## 2020-03-08 DIAGNOSIS — L40.50 PSORIATIC ARTHRITIS (H): ICD-10-CM

## 2020-03-08 DIAGNOSIS — M77.9 ENTHESITIS: ICD-10-CM

## 2020-03-09 ENCOUNTER — COMMUNICATION - HEALTHEAST (OUTPATIENT)
Dept: RHEUMATOLOGY | Facility: CLINIC | Age: 50
End: 2020-03-09

## 2020-04-27 ENCOUNTER — COMMUNICATION - HEALTHEAST (OUTPATIENT)
Dept: RHEUMATOLOGY | Facility: CLINIC | Age: 50
End: 2020-04-27

## 2020-04-27 DIAGNOSIS — M06.4 INFLAMMATORY POLYARTHRITIS (H): ICD-10-CM

## 2020-04-27 DIAGNOSIS — L40.50 PSORIATIC ARTHRITIS (H): ICD-10-CM

## 2020-04-27 DIAGNOSIS — M77.9 ENTHESITIS: ICD-10-CM

## 2020-04-28 ENCOUNTER — AMBULATORY - HEALTHEAST (OUTPATIENT)
Dept: LAB | Facility: CLINIC | Age: 50
End: 2020-04-28

## 2020-04-28 DIAGNOSIS — L40.50 PSORIATIC ARTHRITIS (H): ICD-10-CM

## 2020-04-28 LAB
ALBUMIN SERPL-MCNC: 3.9 G/DL (ref 3.5–5)
ALT SERPL W P-5'-P-CCNC: 32 U/L (ref 0–45)
CREAT SERPL-MCNC: 0.73 MG/DL (ref 0.6–1.1)
ERYTHROCYTE [DISTWIDTH] IN BLOOD BY AUTOMATED COUNT: 12.4 % (ref 11–14.5)
GFR SERPL CREATININE-BSD FRML MDRD: >60 ML/MIN/1.73M2
HCT VFR BLD AUTO: 40.4 % (ref 35–47)
HGB BLD-MCNC: 13.4 G/DL (ref 12–16)
MCH RBC QN AUTO: 29.5 PG (ref 27–34)
MCHC RBC AUTO-ENTMCNC: 33.1 G/DL (ref 32–36)
MCV RBC AUTO: 89 FL (ref 80–100)
PLATELET # BLD AUTO: 292 THOU/UL (ref 140–440)
PMV BLD AUTO: 7.2 FL (ref 7–10)
RBC # BLD AUTO: 4.52 MILL/UL (ref 3.8–5.4)
WBC: 5.9 THOU/UL (ref 4–11)

## 2020-05-04 ENCOUNTER — OFFICE VISIT - HEALTHEAST (OUTPATIENT)
Dept: RHEUMATOLOGY | Facility: CLINIC | Age: 50
End: 2020-05-04

## 2020-05-04 DIAGNOSIS — M06.4 INFLAMMATORY POLYARTHRITIS (H): ICD-10-CM

## 2020-05-04 DIAGNOSIS — L40.50 PSORIATIC ARTHRITIS (H): ICD-10-CM

## 2020-05-04 DIAGNOSIS — M19.079 1ST MTP ARTHRITIS: ICD-10-CM

## 2020-05-04 DIAGNOSIS — Z79.899 HIGH RISK MEDICATION USE: ICD-10-CM

## 2020-05-23 ENCOUNTER — COMMUNICATION - HEALTHEAST (OUTPATIENT)
Dept: RHEUMATOLOGY | Facility: CLINIC | Age: 50
End: 2020-05-23

## 2020-05-23 DIAGNOSIS — M77.9 ENTHESITIS: ICD-10-CM

## 2020-05-23 DIAGNOSIS — L40.50 PSORIATIC ARTHRITIS (H): ICD-10-CM

## 2020-05-26 ENCOUNTER — COMMUNICATION - HEALTHEAST (OUTPATIENT)
Dept: RHEUMATOLOGY | Facility: CLINIC | Age: 50
End: 2020-05-26

## 2020-05-27 ENCOUNTER — COMMUNICATION - HEALTHEAST (OUTPATIENT)
Dept: RHEUMATOLOGY | Facility: CLINIC | Age: 50
End: 2020-05-27

## 2020-05-27 DIAGNOSIS — L40.50 PSORIATIC ARTHRITIS (H): ICD-10-CM

## 2020-05-27 DIAGNOSIS — M77.9 ENTHESITIS: ICD-10-CM

## 2020-07-20 ENCOUNTER — COMMUNICATION - HEALTHEAST (OUTPATIENT)
Dept: RHEUMATOLOGY | Facility: CLINIC | Age: 50
End: 2020-07-20

## 2020-07-20 DIAGNOSIS — L40.50 PSORIATIC ARTHRITIS (H): ICD-10-CM

## 2020-07-20 DIAGNOSIS — M06.4 INFLAMMATORY POLYARTHRITIS (H): ICD-10-CM

## 2020-07-20 DIAGNOSIS — M77.9 ENTHESITIS: ICD-10-CM

## 2020-07-22 ENCOUNTER — AMBULATORY - HEALTHEAST (OUTPATIENT)
Dept: LAB | Facility: CLINIC | Age: 50
End: 2020-07-22

## 2020-07-22 DIAGNOSIS — L40.50 PSORIATIC ARTHRITIS (H): ICD-10-CM

## 2020-07-22 LAB
ALBUMIN SERPL-MCNC: 3.8 G/DL (ref 3.5–5)
ALT SERPL W P-5'-P-CCNC: 24 U/L (ref 0–45)
CREAT SERPL-MCNC: 0.67 MG/DL (ref 0.6–1.1)
ERYTHROCYTE [DISTWIDTH] IN BLOOD BY AUTOMATED COUNT: 12.4 % (ref 11–14.5)
GFR SERPL CREATININE-BSD FRML MDRD: >60 ML/MIN/1.73M2
HCT VFR BLD AUTO: 39.6 % (ref 35–47)
HGB BLD-MCNC: 13.4 G/DL (ref 12–16)
MCH RBC QN AUTO: 29.8 PG (ref 27–34)
MCHC RBC AUTO-ENTMCNC: 33.7 G/DL (ref 32–36)
MCV RBC AUTO: 89 FL (ref 80–100)
PLATELET # BLD AUTO: 337 THOU/UL (ref 140–440)
PMV BLD AUTO: 7.2 FL (ref 7–10)
RBC # BLD AUTO: 4.48 MILL/UL (ref 3.8–5.4)
WBC: 5.8 THOU/UL (ref 4–11)

## 2020-08-03 ENCOUNTER — COMMUNICATION - HEALTHEAST (OUTPATIENT)
Dept: RHEUMATOLOGY | Facility: CLINIC | Age: 50
End: 2020-08-03

## 2020-08-20 ENCOUNTER — OFFICE VISIT - HEALTHEAST (OUTPATIENT)
Dept: RHEUMATOLOGY | Facility: CLINIC | Age: 50
End: 2020-08-20

## 2020-08-20 DIAGNOSIS — Z84.0 FAMILY HISTORY OF PSORIASIS IN SISTER: ICD-10-CM

## 2020-08-20 DIAGNOSIS — M19.079 1ST MTP ARTHRITIS: ICD-10-CM

## 2020-08-20 DIAGNOSIS — L40.50 PSORIATIC ARTHRITIS (H): ICD-10-CM

## 2020-08-20 DIAGNOSIS — Z79.899 HIGH RISK MEDICATION USE: ICD-10-CM

## 2020-10-13 ENCOUNTER — COMMUNICATION - HEALTHEAST (OUTPATIENT)
Dept: RHEUMATOLOGY | Facility: CLINIC | Age: 50
End: 2020-10-13

## 2020-10-13 DIAGNOSIS — M06.4 INFLAMMATORY POLYARTHRITIS (H): ICD-10-CM

## 2020-10-13 DIAGNOSIS — M77.9 ENTHESITIS: ICD-10-CM

## 2020-10-13 DIAGNOSIS — L40.50 PSORIATIC ARTHRITIS (H): ICD-10-CM

## 2020-10-16 ENCOUNTER — AMBULATORY - HEALTHEAST (OUTPATIENT)
Dept: LAB | Facility: CLINIC | Age: 50
End: 2020-10-16

## 2020-10-16 DIAGNOSIS — L40.50 PSORIATIC ARTHRITIS (H): ICD-10-CM

## 2020-10-16 LAB
ALBUMIN SERPL-MCNC: 3.6 G/DL (ref 3.5–5)
ALT SERPL W P-5'-P-CCNC: 21 U/L (ref 0–45)
CREAT SERPL-MCNC: 0.68 MG/DL (ref 0.6–1.1)
ERYTHROCYTE [DISTWIDTH] IN BLOOD BY AUTOMATED COUNT: 12.6 % (ref 11–14.5)
GFR SERPL CREATININE-BSD FRML MDRD: >60 ML/MIN/1.73M2
HCT VFR BLD AUTO: 37.9 % (ref 35–47)
HGB BLD-MCNC: 12.7 G/DL (ref 12–16)
MCH RBC QN AUTO: 30.3 PG (ref 27–34)
MCHC RBC AUTO-ENTMCNC: 33.5 G/DL (ref 32–36)
MCV RBC AUTO: 91 FL (ref 80–100)
PLATELET # BLD AUTO: 296 THOU/UL (ref 140–440)
PMV BLD AUTO: 6.9 FL (ref 7–10)
RBC # BLD AUTO: 4.19 MILL/UL (ref 3.8–5.4)
WBC: 4.6 THOU/UL (ref 4–11)

## 2020-11-29 ENCOUNTER — COMMUNICATION - HEALTHEAST (OUTPATIENT)
Dept: RHEUMATOLOGY | Facility: CLINIC | Age: 50
End: 2020-11-29

## 2020-11-29 DIAGNOSIS — M77.9 ENTHESITIS: ICD-10-CM

## 2020-11-29 DIAGNOSIS — L40.50 PSORIATIC ARTHRITIS (H): ICD-10-CM

## 2020-11-30 ENCOUNTER — COMMUNICATION - HEALTHEAST (OUTPATIENT)
Dept: RHEUMATOLOGY | Facility: CLINIC | Age: 50
End: 2020-11-30

## 2021-01-07 ENCOUNTER — COMMUNICATION - HEALTHEAST (OUTPATIENT)
Dept: RHEUMATOLOGY | Facility: CLINIC | Age: 51
End: 2021-01-07

## 2021-01-07 DIAGNOSIS — M06.4 INFLAMMATORY POLYARTHRITIS (H): ICD-10-CM

## 2021-01-07 DIAGNOSIS — L40.50 PSORIATIC ARTHRITIS (H): ICD-10-CM

## 2021-01-07 DIAGNOSIS — M77.9 ENTHESITIS: ICD-10-CM

## 2021-01-18 ENCOUNTER — AMBULATORY - HEALTHEAST (OUTPATIENT)
Dept: LAB | Facility: CLINIC | Age: 51
End: 2021-01-18

## 2021-01-18 ENCOUNTER — COMMUNICATION - HEALTHEAST (OUTPATIENT)
Dept: ADMINISTRATIVE | Facility: CLINIC | Age: 51
End: 2021-01-18

## 2021-01-18 DIAGNOSIS — M06.4 INFLAMMATORY POLYARTHRITIS (H): ICD-10-CM

## 2021-01-18 DIAGNOSIS — L40.50 PSORIATIC ARTHRITIS (H): ICD-10-CM

## 2021-01-18 DIAGNOSIS — M77.9 ENTHESITIS: ICD-10-CM

## 2021-01-18 LAB
ALBUMIN SERPL-MCNC: 3.8 G/DL (ref 3.5–5)
ALT SERPL W P-5'-P-CCNC: 20 U/L (ref 0–45)
CREAT SERPL-MCNC: 0.62 MG/DL (ref 0.6–1.1)
ERYTHROCYTE [DISTWIDTH] IN BLOOD BY AUTOMATED COUNT: 12.4 % (ref 11–14.5)
GFR SERPL CREATININE-BSD FRML MDRD: >60 ML/MIN/1.73M2
HCT VFR BLD AUTO: 40.3 % (ref 35–47)
HGB BLD-MCNC: 13.4 G/DL (ref 12–16)
MCH RBC QN AUTO: 30 PG (ref 27–34)
MCHC RBC AUTO-ENTMCNC: 33.1 G/DL (ref 32–36)
MCV RBC AUTO: 90 FL (ref 80–100)
PLATELET # BLD AUTO: 323 THOU/UL (ref 140–440)
PMV BLD AUTO: 6.9 FL (ref 7–10)
RBC # BLD AUTO: 4.46 MILL/UL (ref 3.8–5.4)
WBC: 5.1 THOU/UL (ref 4–11)

## 2021-02-13 ENCOUNTER — COMMUNICATION - HEALTHEAST (OUTPATIENT)
Dept: RHEUMATOLOGY | Facility: CLINIC | Age: 51
End: 2021-02-13

## 2021-02-15 ENCOUNTER — COMMUNICATION - HEALTHEAST (OUTPATIENT)
Dept: RHEUMATOLOGY | Facility: CLINIC | Age: 51
End: 2021-02-15

## 2021-02-15 DIAGNOSIS — M77.9 ENTHESITIS: ICD-10-CM

## 2021-02-15 DIAGNOSIS — L40.50 PSORIATIC ARTHRITIS (H): ICD-10-CM

## 2021-03-04 ENCOUNTER — COMMUNICATION - HEALTHEAST (OUTPATIENT)
Dept: RHEUMATOLOGY | Facility: CLINIC | Age: 51
End: 2021-03-04

## 2021-03-08 ENCOUNTER — OFFICE VISIT - HEALTHEAST (OUTPATIENT)
Dept: RHEUMATOLOGY | Facility: CLINIC | Age: 51
End: 2021-03-08

## 2021-03-08 DIAGNOSIS — M77.9 ENTHESITIS: ICD-10-CM

## 2021-03-08 DIAGNOSIS — L40.50 PSORIATIC ARTHRITIS (H): ICD-10-CM

## 2021-03-08 DIAGNOSIS — Z79.899 HIGH RISK MEDICATION USE: ICD-10-CM

## 2021-03-08 DIAGNOSIS — Z84.0 FAMILY HISTORY OF PSORIASIS IN SISTER: ICD-10-CM

## 2021-03-08 DIAGNOSIS — M06.4 INFLAMMATORY POLYARTHRITIS (H): ICD-10-CM

## 2021-05-20 ENCOUNTER — RECORDS - HEALTHEAST (OUTPATIENT)
Dept: ADMINISTRATIVE | Facility: OTHER | Age: 51
End: 2021-05-20

## 2021-05-20 DIAGNOSIS — L40.50 PSORIATIC ARTHRITIS (H): ICD-10-CM

## 2021-05-20 DIAGNOSIS — M77.9 ENTHESITIS: ICD-10-CM

## 2021-05-20 RX ORDER — APREMILAST 30 MG/1
TABLET, FILM COATED ORAL
Qty: 180 TABLET | Refills: 0 | Status: SHIPPED | OUTPATIENT
Start: 2021-05-20 | End: 2021-08-10

## 2021-05-29 NOTE — PROGRESS NOTES
ASSESSMENT AND PLAN:  Essence Rubio 48 y.o. female is here for follow-up of psoriatic arthritis characterized by enthesitis and inflammatory arthropathy, family history of psoriasis and a sister, has tolerated Otezla, with methotrexate recent labs are normal, without evidence of active enthesitis, inflammatory joint condition.  She does however have increasing pain in the lower extremities especially with activity, she has pes planus on her right side, mild pitting edema on the lower extremities, and the tenderness and hypertrophy at the first MTP of the right side.  We discussed how osteoarthritis in its early stages can sometimes be quite symptomatic.  Various modalities of treatment were outlined.  She is going to read up on duloxetine, she was going to take Tylenol before going for her walks, she is going to try over-the-counter insoles and then see podiatry if still needed.  Should she want to try duloxetine she will let us know and then 20 mg daily may be started.  We will meet here sooner this time in 3 months with labs just prior.         Diagnoses and all orders for this visit:    Psoriatic arthritis (H)  -     apremilast 30 mg Tab; Take 30 mg by mouth 2 (two) times a day.  Dispense: 60 tablet; Refill: 3    Family history of psoriasis in sister    High risk medication use    1st MTP arthritis    Enthesitis -knee  -     apremilast 30 mg Tab; Take 30 mg by mouth 2 (two) times a day.  Dispense: 60 tablet; Refill: 3          HISTORY OF PRESENTING ILLNESS:  Essence Rubio 47 y.o. , a second , is here for follow-up of psoriatic arthritis.  This is manifested with inflammatory arthropathy, enthesitis.  She has done so much better now.  She noted that the Otezla has been very helpful.  Over the past 6 weeks however she has noted additional symptoms.  This pertains to pain in the lower extremities between the knees and the ankles, worse with activity sometimes and feet.  This feels achy,  interferes with her desire to go walking, and can last after whole day's work associated with stiffness for 20 minutes or so.  She rates the symptoms as moderately severe.  None of the upper extremity joints are similarly affected.  She has noted difficulty performing some of the day-to-day activities because of these.  She noted the morning stiffness is no more than 45 minutes.  She had noted swelling on her ankles and wondered if that was to do with the arthropathy especially toward the latter part of the day when the socks leave deep impressions there.    She has not had fever weight loss blurry vision eye redness mouth ulcer nausea cough or rash.  As she went back to school she had more pain and achiness she had a brief course of steroids which she has finished now and did well after that.. She is a nonsmoker, alcohol rarely, no regular exercise, she gets she does not get a refreshing sleep at night. She has had fracture of her ankle or right sided, and 2004. She had bunion repair. She is status post hysteroscopy and ablation. She is unable to take the penicillin because of anaphylactic shock. Further historical information, including ROS and limitation in  activities as noted in the multidimensional health assessment questionnaire scanned in the EMR and in the assessment and plan section.. ALLERGIES:Penicillins    PAST MEDICAL/ACTIVE PROBLEMS/MEDICATION/SOCIAL DATA  Past Medical History:   Diagnosis Date     Asthma     aggravated with colds     History of anesthesia complications      PONV (postoperative nausea and vomiting)      Rheumatoid arthritis (H)      Social History     Tobacco Use   Smoking Status Never Smoker   Smokeless Tobacco Never Used     Patient Active Problem List   Diagnosis     Gallstone     High risk medication use     S/P vaginal hysterectomy     Trochanteric bursitis of both hips     Acute bilateral low back pain with sciatica     Family history of psoriasis in sister     Piriformis  syndrome, unspecified laterality     Enthesitis -knee     Psoriatic arthritis (H)     Current Outpatient Medications   Medication Sig Dispense Refill     acetaminophen (TYLENOL) 500 MG tablet Take 1,000 mg by mouth every 6 (six) hours as needed for pain.       cetirizine (ZYRTEC) 10 MG tablet Take 10 mg by mouth daily.       ergocalciferol (VITAMIN D2) 50,000 unit capsule Take 50,000 Units by mouth every 7 days.              estradiol valerate (DELESTROGEN) 20 mg/mL injection Inject 20 mg into the shoulder, thigh, or buttocks every 28 days.       folic acid (FOLVITE) 1 MG tablet TAKE 1 TABLET DAILY 90 tablet 3     methotrexate 2.5 MG tablet TAKE 10 TABLETS ONCE A WEEK 120 tablet 0     apremilast 30 mg Tab Take 30 mg by mouth 2 (two) times a day. 60 tablet 3     No current facility-administered medications for this visit.      DETAILED EXAMINATION  05/20/19  :  There were no vitals filed for this visit.  Alert oriented. Head including the face is examined for malar rash, heliotropes, scarring, lupus pernio. Eyes examined for redness such as in episcleritis/scleritis, periorbital lesions.   Neck/ Face examined for parotid gland swelling, range of motion of neck.  Left upper and lower and right upper and lower extremities examined for tenderness, swelling, warmth of the appendicular joints, range of motion, edema, rash.  Some of the important findings included: She does not have synovitis in any of the palpable joints of upper extremities full range of motion of the shoulders knees without effusion warmth or JLT.  She does not have dactylitis of the digits or the toes.  She has pes planus on the right ankle.  She has tenderness of the first MTPs especially the right side..           LAB / IMAGING DATA:  ALT   Date Value Ref Range Status   05/15/2019 20 0 - 45 U/L Final   02/12/2019 30 0 - 45 U/L Final   11/27/2018 44 0 - 45 U/L Final     Albumin   Date Value Ref Range Status   05/15/2019 3.8 3.5 - 5.0 g/dL Final    02/12/2019 3.9 3.5 - 5.0 g/dL Final   11/27/2018 3.8 3.5 - 5.0 g/dL Final     Creatinine   Date Value Ref Range Status   05/15/2019 0.68 0.60 - 1.10 mg/dL Final   02/12/2019 0.67 0.60 - 1.10 mg/dL Final   11/27/2018 0.69 0.60 - 1.10 mg/dL Final       WBC   Date Value Ref Range Status   05/15/2019 5.9 4.0 - 11.0 thou/uL Final   02/12/2019 5.8 4.0 - 11.0 thou/uL Final   09/10/2015 9.1 4.0 - 11.0 thou/uL Final     Hemoglobin   Date Value Ref Range Status   05/15/2019 12.7 12.0 - 16.0 g/dL Final   02/12/2019 12.4 12.0 - 16.0 g/dL Final   11/27/2018 12.6 12.0 - 16.0 g/dL Final     Platelets   Date Value Ref Range Status   05/15/2019 293 140 - 440 thou/uL Final   02/12/2019 305 140 - 440 thou/uL Final   11/27/2018 293 140 - 440 thou/uL Final       Lab Results   Component Value Date    RF <15.0 08/30/2017    SEDRATE 5 08/30/2017

## 2021-05-29 NOTE — TELEPHONE ENCOUNTER
[facility] calling again, stating that Otezla medication, plan will not cover.    preferred alternative medication. through [facility] with PA, with [facility] pharmacy.    They can be reached @ [phone] ref # [id number] mon fri 9am - 530pm eastern time

## 2021-05-29 NOTE — TELEPHONE ENCOUNTER
Aware Otezla was denied. [facility]-pharmacy liasion is working with pt and plans to resubmit application for free drug program in June.

## 2021-05-29 NOTE — TELEPHONE ENCOUNTER
[facility] called to notified that the PA has denied & wondering if will go with the alternative medications.    When we call, they will notify us what they are.    phone  ref # [id number]

## 2021-05-30 VITALS — WEIGHT: 205.2 LBS | BODY MASS INDEX: 31.1 KG/M2 | HEIGHT: 68 IN

## 2021-05-30 NOTE — TELEPHONE ENCOUNTER
Medication: Otezla 30mg  Sponsor: [employer]  Phone #: [phone]  Fax #: [phone]  Additional Information: application faxed in on 6/21/2019 by CHRISTIAN Cano

## 2021-05-30 NOTE — TELEPHONE ENCOUNTER
Medication: Otezla 30mg  Payer or Group: [employer]  Phone #: [phone]  Fax #: [phone]  Additional Information: Free drug program requires 1 PA denial and 2 Appeal denials. This is 1st level appeal  Peer review Option?   Patient Notified? Aware

## 2021-05-31 ENCOUNTER — RECORDS - HEALTHEAST (OUTPATIENT)
Dept: ADMINISTRATIVE | Facility: CLINIC | Age: 51
End: 2021-05-31

## 2021-05-31 VITALS — WEIGHT: 206 LBS | BODY MASS INDEX: 31.22 KG/M2 | HEIGHT: 68 IN

## 2021-05-31 VITALS — BODY MASS INDEX: 31.07 KG/M2 | WEIGHT: 205 LBS | HEIGHT: 68 IN

## 2021-05-31 VITALS — WEIGHT: 205 LBS | HEIGHT: 68 IN | BODY MASS INDEX: 31.07 KG/M2

## 2021-05-31 VITALS — HEIGHT: 68 IN | WEIGHT: 205 LBS | BODY MASS INDEX: 31.07 KG/M2

## 2021-06-01 VITALS — HEIGHT: 68 IN | BODY MASS INDEX: 31.07 KG/M2 | WEIGHT: 205 LBS

## 2021-06-01 VITALS — WEIGHT: 205 LBS | HEIGHT: 68 IN | BODY MASS INDEX: 31.07 KG/M2

## 2021-06-01 VITALS — WEIGHT: 205 LBS | BODY MASS INDEX: 31.63 KG/M2

## 2021-06-02 VITALS — WEIGHT: 195 LBS | BODY MASS INDEX: 30.09 KG/M2

## 2021-06-02 NOTE — PROGRESS NOTES
ASSESSMENT AND PLAN:  Essence Rubio 49 y.o. female is here for follow-up.  She has psoriatic arthritis, family history of psoriasis, degenerative joint disease such as sacroiliac, doing great on the current combination of methotrexate and Otezla, she is no longer taking duloxetine.  Recent labs are within normal range.  Stay the course.  We will meet here in 6 months labs every 3 months.          Diagnoses and all orders for this visit:    Psoriatic arthritis (H)  -     methotrexate 2.5 MG tablet; TAKE 10 TABLETS ONCE A WEEK  Dispense: 120 tablet; Refill: 0    Inflammatory polyarthritis (H)  -     methotrexate 2.5 MG tablet; TAKE 10 TABLETS ONCE A WEEK  Dispense: 120 tablet; Refill: 0    High risk medication use    Family history of psoriasis in sister    Enthesitis -knee  -     methotrexate 2.5 MG tablet; TAKE 10 TABLETS ONCE A WEEK  Dispense: 120 tablet; Refill: 0          HISTORY OF PRESENTING ILLNESS:  Essence Rubio 47 y.o. , a second , is here for follow-up of psoriatic arthritis, enthesitis.  Background of degenerative joint symptoms.  Is doing great with the current combination.  She noted mild discomfort in the right hand.  1.0/10 able to do all her day-to-day activities.  No significant pain in the lower back.  Morning stiffness no more than 5 minutes.  Recent labs are within acceptable range.  She is no longer taking duloxetine on a regular basis. She had noted swelling on her ankles and wondered if that was to do with the arthropathy especially toward the latter part of the day when the socks leave deep impressions there.    She has not had fever weight loss blurry vision eye redness mouth ulcer nausea cough or rash.  As she went back to school she had more pain and achiness she had a brief course of steroids which she has finished now and did well after that.. She is a nonsmoker, alcohol rarely, no regular exercise, she gets she does not get a refreshing sleep at night. She has  had fracture of her ankle or right sided, and 2004. She had bunion repair. She is status post hysteroscopy and ablation. She is unable to take the penicillin because of anaphylactic shock. Further historical information, including ROS and limitation in  activities as noted in the multidimensional health assessment questionnaire scanned in the EMR and in the assessment and plan section.. ALLERGIES:Penicillins    PAST MEDICAL/ACTIVE PROBLEMS/MEDICATION/SOCIAL DATA  Past Medical History:   Diagnosis Date     Asthma     aggravated with colds     History of anesthesia complications      PONV (postoperative nausea and vomiting)      Rheumatoid arthritis (H)      Social History     Tobacco Use   Smoking Status Never Smoker   Smokeless Tobacco Never Used     Patient Active Problem List   Diagnosis     Gallstone     High risk medication use     S/P vaginal hysterectomy     Trochanteric bursitis of both hips     Acute bilateral low back pain with sciatica     Family history of psoriasis in sister     Piriformis syndrome, unspecified laterality     Enthesitis -knee     Psoriatic arthritis (H)     1st MTP arthritis     Current Outpatient Medications   Medication Sig Dispense Refill     acetaminophen (TYLENOL) 500 MG tablet Take 1,000 mg by mouth every 6 (six) hours as needed for pain.       cetirizine (ZYRTEC) 10 MG tablet Take 10 mg by mouth daily.       ergocalciferol (VITAMIN D2) 50,000 unit capsule Take 50,000 Units by mouth every 7 days.              estradiol valerate (DELESTROGEN) 20 mg/mL injection Inject 20 mg into the shoulder, thigh, or buttocks every 28 days.       folic acid (FOLVITE) 1 MG tablet TAKE 1 TABLET DAILY 90 tablet 3     methotrexate 2.5 MG tablet TAKE 10 TABLETS ONCE A WEEK 120 tablet 0     OTEZLA 30 mg Tab TAKE 1 TABLET TWO TIMES A  tablet 0     No current facility-administered medications for this visit.      DETAILED EXAMINATION  10/29/19  :  There were no vitals filed for this visit.  Alert  oriented. Head including the face is examined for malar rash, heliotropes, scarring, lupus pernio. Eyes examined for redness such as in episcleritis/scleritis, periorbital lesions.   Neck/ Face examined for parotid gland swelling, range of motion of neck.  Left upper and lower and right upper and lower extremities examined for tenderness, swelling, warmth of the appendicular joints, range of motion, edema, rash.  Some of the important findings included:she does not have evidence of synovitis in the palpable joints of the upper extremities.  No significant deformities of the digits.  no [place]berden nodes.  Range of motion of the shoulders show full abduction.  No JLT effusion or warmth of the knees.  There is no digital dactylitis.                  LAB / IMAGING DATA:  ALT   Date Value Ref Range Status   10/23/2019 25 0 - 45 U/L Final   08/07/2019 25 0 - 45 U/L Final   05/15/2019 20 0 - 45 U/L Final     Albumin   Date Value Ref Range Status   10/23/2019 3.8 3.5 - 5.0 g/dL Final   08/07/2019 3.9 3.5 - 5.0 g/dL Final   05/15/2019 3.8 3.5 - 5.0 g/dL Final     Creatinine   Date Value Ref Range Status   10/23/2019 0.75 0.60 - 1.10 mg/dL Final   08/07/2019 0.68 0.60 - 1.10 mg/dL Final   05/15/2019 0.68 0.60 - 1.10 mg/dL Final       WBC   Date Value Ref Range Status   10/23/2019 6.8 4.0 - 11.0 thou/uL Final   08/07/2019 5.3 4.0 - 11.0 thou/uL Final   09/10/2015 9.1 4.0 - 11.0 thou/uL Final     Hemoglobin   Date Value Ref Range Status   10/23/2019 12.4 12.0 - 16.0 g/dL Final   08/07/2019 13.0 12.0 - 16.0 g/dL Final   05/15/2019 12.7 12.0 - 16.0 g/dL Final     Platelets   Date Value Ref Range Status   10/23/2019 315 140 - 440 thou/uL Final   08/07/2019 320 140 - 440 thou/uL Final   05/15/2019 293 140 - 440 thou/uL Final       Lab Results   Component Value Date    RF <15.0 08/30/2017    SEDRATE 5 08/30/2017

## 2021-06-02 NOTE — TELEPHONE ENCOUNTER
Last OV 5/20/2019  Last lab 10/23/2019  Future appt 10/29/2019    Please review lab and advise is refill appropriate.

## 2021-06-03 VITALS
WEIGHT: 198.7 LBS | HEIGHT: 68 IN | HEART RATE: 70 BPM | BODY MASS INDEX: 30.11 KG/M2 | SYSTOLIC BLOOD PRESSURE: 122 MMHG | DIASTOLIC BLOOD PRESSURE: 80 MMHG

## 2021-06-03 VITALS — BODY MASS INDEX: 30.71 KG/M2 | WEIGHT: 199 LBS

## 2021-06-03 NOTE — TELEPHONE ENCOUNTER
New Rx for MTX is pended. Please review and sign. Thanks     Vaishali Lebron CMA [facility] 11/12/2019 8:04 AM               The methotrexate prescription was sent to [facility].  But this prescription renewal should have been sent to [facility].  I did not  the prescription from [facility].  Could you please send a renewal to [facility]?

## 2021-06-05 NOTE — TELEPHONE ENCOUNTER
"Patient has a lab appointment on 1/27 but there are no orders. Notes say \"Dr Ho\".  Please place orders as needed.    Zenobia Love    "

## 2021-06-07 ENCOUNTER — AMBULATORY - HEALTHEAST (OUTPATIENT)
Dept: LAB | Facility: CLINIC | Age: 51
End: 2021-06-07

## 2021-06-07 DIAGNOSIS — M06.4 INFLAMMATORY POLYARTHRITIS (H): ICD-10-CM

## 2021-06-07 DIAGNOSIS — L40.50 PSORIATIC ARTHRITIS (H): ICD-10-CM

## 2021-06-07 LAB
ALBUMIN SERPL-MCNC: 3.8 G/DL (ref 3.5–5)
ALT SERPL W P-5'-P-CCNC: 22 U/L (ref 0–45)
CREAT SERPL-MCNC: 0.71 MG/DL (ref 0.6–1.1)
ERYTHROCYTE [DISTWIDTH] IN BLOOD BY AUTOMATED COUNT: 14.4 % (ref 11–14.5)
GFR SERPL CREATININE-BSD FRML MDRD: >60 ML/MIN/1.73M2
HCT VFR BLD AUTO: 36.5 % (ref 35–47)
HGB BLD-MCNC: 12.3 G/DL (ref 12–16)
MCH RBC QN AUTO: 29.5 PG (ref 27–34)
MCHC RBC AUTO-ENTMCNC: 33.7 G/DL (ref 32–36)
MCV RBC AUTO: 88 FL (ref 80–100)
PLATELET # BLD AUTO: 277 THOU/UL (ref 140–440)
PMV BLD AUTO: 8.9 FL (ref 7–10)
RBC # BLD AUTO: 4.17 MILL/UL (ref 3.8–5.4)
WBC: 5.6 THOU/UL (ref 4–11)

## 2021-06-07 NOTE — PROGRESS NOTES
"Essence Rubio is a 49 y.o. female who is being evaluated via a billable video visit.      The patient has been notified of following:     \"This video visit will be conducted via a call between you and your physician/provider. We have found that certain health care needs can be provided without the need for an in-person physical exam.  This service lets us provide the care you need with a video conversation.  If a prescription is necessary we can send it directly to your pharmacy.  If lab work is needed we can place an order for that and you can then stop by our lab to have the test done at a later time.    Video visits are billed at different rates depending on your insurance coverage. Please reach out to your insurance provider with any questions.    If during the course of the call the physician/provider feels a video visit is not appropriate, you will not be charged for this service.\"    Patient has given verbal consent to a Video visit? Yes    Patient would like to receive their AVS by AVS Preference: Pablo.    Patient would like the video invitation sent by: Text to cell phone: [phone]    Will anyone else be joining your video visit? No          Video-Visit Details    Type of service:  Video Visit    Originating Location (pt. Location): Home    Distant Location (provider location):  [facility]     [facility] for Video Visit[facility]      ASSESSMENT AND PLAN:    Diagnoses and all orders for this visit:    Psoriatic arthritis (H)    1st MTP arthritis    High risk medication use    Inflammatory polyarthritis (H)  -     folic acid (FOLVITE) 1 MG tablet; TAKE 1 TABLET DAILY  Dispense: 90 tablet; Refill: 3          HISTORY OF PRESENTING ILLNESS:  Essence Rubio 49 y.o. is evaluated here via video link.  She is here for follow-up of psoriatic arthritis, background history of inflammatory joint disease and family history of psoriasis, sister.  She has done so much better with a combination " of methotrexate and Otezla.  She has no residual swelling pain or stiffness with the exception of discomfort in the shins that troubles her intermittently, this is mild to moderately severe, sometimes it happens toward the end of the day at times even at nighttime.  She takes Tylenol with some help.  This shin area pain she feels is improved very significantly since she started Otezla.  Her recent labs are reviewed within normal range.  ROS enquiry held for fever, ocular symptoms, rash, headache,  GI issues.  Today we also discussed the issues related to the current pandemic, the pros and cons of the current treatment plan, the CDC guidelines such as social distancing washing the hands covering the cough.  ALLERGIES:Penicillins    PAST MEDICAL/ACTIVE PROBLEMS/MEDICATION/SOCIAL DATA  Past Medical History:   Diagnosis Date     Asthma     aggravated with colds     History of anesthesia complications      PONV (postoperative nausea and vomiting)      Rheumatoid arthritis (H)      Social History     Tobacco Use   Smoking Status Never Smoker   Smokeless Tobacco Never Used     Patient Active Problem List   Diagnosis     Gallstone     High risk medication use     S/P vaginal hysterectomy     Trochanteric bursitis of both hips     Acute bilateral low back pain with sciatica     Family history of psoriasis in sister     Piriformis syndrome, unspecified laterality     Enthesitis -knee     Psoriatic arthritis (H)     1st MTP arthritis     Current Outpatient Medications   Medication Sig Dispense Refill     acetaminophen (TYLENOL) 500 MG tablet Take 1,000 mg by mouth every 6 (six) hours as needed for pain.       apremilast (OTEZLA) 30 mg Tab Take 1 tablet (30 mg total) by mouth 2 (two) times a day. 180 tablet 0     cetirizine (ZYRTEC) 10 MG tablet Take 10 mg by mouth daily.       ergocalciferol (VITAMIN D2) 50,000 unit capsule Take 50,000 Units by mouth every 7 days.              estradiol (ESTRACE) 2 MG tablet Take 2 mg by mouth  daily.              folic acid (FOLVITE) 1 MG tablet TAKE 1 TABLET DAILY 90 tablet 3     methotrexate 2.5 MG tablet TAKE 10 TABLETS ONCE A WEEK 120 tablet 0     No current facility-administered medications for this visit.          EXAMINATION:    Using the audio and video link as best as possible the constitutional, neck, neurologic, psych, skin, both upper extremities areas/organ system were evaluated during this assessment.  Some of the important findings: No dactylitis of digits, full abduction in the shoulders.      LAB / IMAGING DATA:  ALT   Date Value Ref Range Status   04/28/2020 32 0 - 45 U/L Final   01/27/2020 22 0 - 45 U/L Final   10/23/2019 25 0 - 45 U/L Final     Albumin   Date Value Ref Range Status   04/28/2020 3.9 3.5 - 5.0 g/dL Final   01/27/2020 3.9 3.5 - 5.0 g/dL Final   10/23/2019 3.8 3.5 - 5.0 g/dL Final     Creatinine   Date Value Ref Range Status   04/28/2020 0.73 0.60 - 1.10 mg/dL Final   01/27/2020 0.70 0.60 - 1.10 mg/dL Final   10/23/2019 0.75 0.60 - 1.10 mg/dL Final       WBC   Date Value Ref Range Status   04/28/2020 5.9 4.0 - 11.0 thou/uL Final   01/27/2020 6.0 4.0 - 11.0 thou/uL Final   09/10/2015 9.1 4.0 - 11.0 thou/uL Final     Hemoglobin   Date Value Ref Range Status   04/28/2020 13.4 12.0 - 16.0 g/dL Final   01/27/2020 12.1 12.0 - 16.0 g/dL Final   10/23/2019 12.4 12.0 - 16.0 g/dL Final     Platelets   Date Value Ref Range Status   04/28/2020 292 140 - 440 thou/uL Final   01/27/2020 306 140 - 440 thou/uL Final   10/23/2019 315 140 - 440 thou/uL Final       Lab Results   Component Value Date    RF <15.0 08/30/2017    SEDRATE 5 08/30/2017     Duration of the call:7  Minutes  Call start: 400  pm  Call end:   407pm

## 2021-06-08 ENCOUNTER — RECORDS - HEALTHEAST (OUTPATIENT)
Dept: ADMINISTRATIVE | Facility: OTHER | Age: 51
End: 2021-06-08

## 2021-06-08 DIAGNOSIS — L40.50 PSORIATIC ARTHRITIS (H): ICD-10-CM

## 2021-06-08 DIAGNOSIS — M06.4 INFLAMMATORY POLYARTHRITIS (H): ICD-10-CM

## 2021-06-08 DIAGNOSIS — M77.9 ENTHESITIS: ICD-10-CM

## 2021-06-09 NOTE — PROGRESS NOTES
ASSESSMENT AND PLAN:  Essence Rubio 46 y.o. female is doing great with the inflammatory polyarthritis which has several features akin to seronegative rheumatoid arthritis.  Recently she had her labs drawn within acceptable range.  Continue methotrexate and hydroxychloroquine 90 day supply as requested and given.  She is aware that labs must be continued every 12 weeks.  We'll meet here in 6 months.  Recently she had low back pain and that has improved since her physical therapy.  She is aware that this is unlikely related to rheumatoid arthritis.  We discussed lumbar spondylosis.       Diagnoses and all orders for this visit:    Inflammatory polyarthritis  -     hydroxychloroquine (PLAQUENIL) 200 mg tablet; TAKE 1 TABLET TWICE A DAY  Dispense: 180 tablet; Refill: 1  -     methotrexate 2.5 MG tablet; TAKE 10 TABLETS (25MG) ONCE A WEEK  Dispense: 120 tablet; Refill: 0    Arthralgia    High risk medication use          HISTORY OF PRESENTING ILLNESS:  Essence Rubio 46 y.o. , a second , is here for follow up of seronegative inflammatory polyarthritis.  With the combination of hydroxychloroquine and methotrexate she seems to have turned the corner.  Patient recently had low back pain.  This is on the left lower back.  Radiated down the leg.  She underwent a primary physician.  The physical therapy was initiated this is more than 50% better.  She has not had recurrence of the symptoms in her hands that is a PIPs and MCPs.  And the symptoms really began a few years ago.  Her recent labs are within acceptable range. Her mother has rheumatoid arthritis. Her sister has psoriasis. She noted fatigue, muscle cramping, neck and back pain, and felt anxiety/depression at times feeling generally blue, and at times difficulty sleeping. She is a nonsmoker, alcohol rarely, no regular exercise, she gets she does not get a refreshing sleep at night. She has had fracture of her ankle or right sided, and 2004. She  "had bunion repair. She is status post hysteroscopy and ablation. She is unable to take the penicillin because of anaphylactic shock.   Further historical information, including ROS and limitation in  activities as noted in the multidimensional health assessment questionnaire scanned in the EMR and in the assessment and plan section.. ALLERGIES:Penicillins    PAST MEDICAL/ACTIVE PROBLEMS/MEDICATION/SOCIAL DATA  Past Medical History:   Diagnosis Date     Asthma     aggravated with colds     History of anesthesia complications      PONV (postoperative nausea and vomiting)      Rheumatoid arthritis      History   Smoking Status     Never Smoker   Smokeless Tobacco     Not on file     Patient Active Problem List   Diagnosis     Gallstone     Inflammatory polyarthritis     High risk medication use     S/P vaginal hysterectomy     Trochanteric bursitis of both hips     Current Outpatient Prescriptions   Medication Sig Dispense Refill     acetaminophen (TYLENOL) 500 MG tablet Take 1,000 mg by mouth every 6 (six) hours as needed for pain.       cetirizine (ZYRTEC) 10 MG tablet Take 10 mg by mouth daily.       hydroxychloroquine (PLAQUENIL) 200 mg tablet TAKE 1 TABLET TWICE A DAY (DUE FOR LABS) 60 tablet 3     methotrexate 2.5 MG tablet TAKE 10 TABLETS (20 MG) ONCE A WEEK 128 tablet 0     methotrexate 2.5 MG tablet TAKE 8 TABLETS (20MG) ONCE A WEEK 96 tablet 0     methotrexate 2.5 MG tablet TAKE 8 TABLETS ONCE A WEEK (NEED TO MAKE LAB APPOINTMENT AND A FOLLOW UP WITH DOCTOR) 32 tablet 1     No current facility-administered medications for this visit.        DETAILED EXAMINATION (six area) :  Vitals:    03/27/17 1038   BP: 112/64   Patient Site: Left Arm   Patient Position: Sitting   Cuff Size: Adult Regular   Pulse: 72   Weight: 205 lb 3.2 oz (93.1 kg)   Height: 5' 7.5\" (1.715 m)     Alert oriented. Head including the face is examined for malar rash, heliotropes, scarring, lupus pernio. Eyes examined for redness such as in " episcleritis/scleritis, periorbital lesions.   Neck examined  for lymph nodes, range of motion Both upper and lower extremities (all four) examined for swollen, warm &/or  tender joints, range of motion, rash, muscle weakness, edema. The salient normal / abnormal findings are appended.    There is no palpable synovitis in any of the appendicular joints.  LAB / IMAGING DATA:  ALT   Date Value Ref Range Status   03/23/2017 42 0 - 45 U/L Final   10/17/2016 27 0 - 45 U/L Final   08/12/2016 25 0 - 45 U/L Final     Albumin   Date Value Ref Range Status   03/23/2017 3.9 3.5 - 5.0 g/dL Final   10/17/2016 4.2 3.5 - 5.0 g/dL Final   08/12/2016 4.1 3.5 - 5.0 g/dL Final     Creatinine   Date Value Ref Range Status   03/23/2017 0.72 0.60 - 1.10 mg/dL Final   10/17/2016 0.75 0.60 - 1.10 mg/dL Final   08/12/2016 0.74 0.60 - 1.10 mg/dL Final       WBC   Date Value Ref Range Status   03/23/2017 5.0 4.0 - 11.0 thou/uL Final   10/19/2016 5.4 4.0 - 11.0 thou/uL Final   09/10/2015 9.1 4.0 - 11.0 thou/uL Final     Hemoglobin   Date Value Ref Range Status   03/23/2017 12.6 12.0 - 16.0 g/dL Final   10/19/2016 12.9 12.0 - 16.0 g/dL Final   08/12/2016 12.9 12.0 - 16.0 g/dL Final     Platelets   Date Value Ref Range Status   03/23/2017 301 140 - 440 thou/uL Final   10/19/2016 264 140 - 440 thou/uL Final   08/12/2016 290 140 - 440 thou/uL Final       Lab Results   Component Value Date    RF <15.0 04/30/2015    SEDRATE 4 06/29/2016

## 2021-06-10 NOTE — TELEPHONE ENCOUNTER
Appointment with Dr. Ho on Tuesday 8/4/2020 is confirmed by the patient. VC confirmed via cellphone.     Vaishali Lebron CMA [facility] 8/3/2020 12:48 PM

## 2021-06-10 NOTE — PROGRESS NOTES
"Essence Rubio is a 49 y.o. female who is being evaluated via a billable video visit.      The patient has been notified of following:     \"This video visit will be conducted via a call between you and your physician/provider. We have found that certain health care needs can be provided without the need for an in-person physical exam.  This service lets us provide the care you need with a video conversation.  If a prescription is necessary we can send it directly to your pharmacy.  If lab work is needed we can place an order for that and you can then stop by our lab to have the test done at a later time.    Video visits are billed at different rates depending on your insurance coverage. Please reach out to your insurance provider with any questions.    If during the course of the call the physician/provider feels a video visit is not appropriate, you will not be charged for this service.\"    Patient has given verbal consent to a Video visit? Yes  How would you like to obtain your AVS? AVS Preference: [facility].  If dropped by the video visit, the video invitation should be sent to: Text to cell phone: [phone]  Will anyone else be joining your video visit? No            Video-Visit Details    Type of service:  Video Visit    Originating Location (pt. Location): Home    Distant Location (provider location):  [facility]     Platform used for Video Visit: [employer]      ASSESSMENT AND PLAN:    Diagnoses and all orders for this visit:    Psoriatic arthritis (H)    1st MTP arthritis    High risk medication use    Family history of psoriasis in sister          HISTORY OF PRESENTING ILLNESS:  Essence Rubio 49 y.o. is evaluated here via video link.  This is for follow-up.  She has psoriatic arthritis, sisters history of psoriasis her own of enthesitis and inflammatory joint disease, on methotrexate, Otezla, folic acid, recent labs are entirely normal, she reports no flareup of her joint symptoms.  She has " tolerated Otezla nicely.  There are no GI side effects, headache, depression.  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 ROS enquiry held for fever, ocular symptoms, rash, headache,  GI issues.  Today we also discussed the issues related to the current pandemic, the pros and cons of the current treatment plan, the CDC guidelines such as social distancing washing the hands covering the cough.  ALLERGIES:Penicillins    PAST MEDICAL/ACTIVE PROBLEMS/MEDICATION/SOCIAL DATA  Past Medical History:   Diagnosis Date     Asthma     aggravated with colds     History of anesthesia complications      PONV (postoperative nausea and vomiting)      Rheumatoid arthritis (H)      Social History     Tobacco Use   Smoking Status Never Smoker   Smokeless Tobacco Never Used     Patient Active Problem List   Diagnosis     Gallstone     High risk medication use     S/P vaginal hysterectomy     Trochanteric bursitis of both hips     Acute bilateral low back pain with sciatica     Family history of psoriasis in sister     Piriformis syndrome, unspecified laterality     Enthesitis -knee     Psoriatic arthritis (H)     1st MTP arthritis     Current Outpatient Medications   Medication Sig Dispense Refill     acetaminophen (TYLENOL) 500 MG tablet Take 1,000 mg by mouth every 6 (six) hours as needed for pain.       cetirizine (ZYRTEC) 10 MG tablet Take 10 mg by mouth daily.       ergocalciferol (VITAMIN D2) 50,000 unit capsule Take 50,000 Units by mouth every 7 days.              estradiol (ESTRACE) 2 MG tablet Take 2 mg by mouth daily.              folic acid (FOLVITE) 1 MG tablet TAKE 1 TABLET DAILY 90 tablet 3     methotrexate 2.5 MG tablet TAKE 10 TABLETS ONCE A WEEK 120 tablet 0     OTEZLA 30 mg Tab TAKE 1 TABLET TWICE A  tablet 1     No current facility-administered medications for this visit.          EXAMINATION:    Using the  audio and video link as best as possible the constitutional, neck, neurologic, psych, skin, both upper extremities areas/organ system were evaluated during this assessment.  Some of the important findings: No swelling of the digits no dactylitis, able to make a full fist bilaterally, full abduction of the shoulders.      LAB / IMAGING DATA:  ALT   Date Value Ref Range Status   07/22/2020 24 0 - 45 U/L Final   04/28/2020 32 0 - 45 U/L Final   01/27/2020 22 0 - 45 U/L Final     Albumin   Date Value Ref Range Status   07/22/2020 3.8 3.5 - 5.0 g/dL Final   04/28/2020 3.9 3.5 - 5.0 g/dL Final   01/27/2020 3.9 3.5 - 5.0 g/dL Final     Creatinine   Date Value Ref Range Status   07/22/2020 0.67 0.60 - 1.10 mg/dL Final   04/28/2020 0.73 0.60 - 1.10 mg/dL Final   01/27/2020 0.70 0.60 - 1.10 mg/dL Final       WBC   Date Value Ref Range Status   07/22/2020 5.8 4.0 - 11.0 thou/uL Final   04/28/2020 5.9 4.0 - 11.0 thou/uL Final   09/10/2015 9.1 4.0 - 11.0 thou/uL Final     Hemoglobin   Date Value Ref Range Status   07/22/2020 13.4 12.0 - 16.0 g/dL Final   04/28/2020 13.4 12.0 - 16.0 g/dL Final   01/27/2020 12.1 12.0 - 16.0 g/dL Final     Platelets   Date Value Ref Range Status   07/22/2020 337 140 - 440 thou/uL Final   04/28/2020 292 140 - 440 thou/uL Final   01/27/2020 306 140 - 440 thou/uL Final       Lab Results   Component Value Date    RF <15.0 08/30/2017    SEDRATE 5 08/30/2017     Duration of the call:8  Minutes  Call start: 4:51pm  pm  Call end:   459pm

## 2021-06-11 NOTE — PROGRESS NOTES
ASSESSMENT AND PLAN:  Essence Rubio 46 y.o. female is here for follow-up of inflammatory polyarthritis doing well on hydroxychloroquine methotrexate.  Over the past 3 weeks she has been hurting in the lower back radiating down her lower extremity worse toward the end of the day with features suggestive of lumbar spondylitic characterization.  She has very little to suggest active inflammation in the joints of the lower extremity.  Going to consider various options of these tramadol 50 mg 3 times daily as selected.  Should she not have significant improvement in 3 weeks she will let us know and then will be a candidate for going to the spine clinic the likelihood that this is an inflammatory arthropathy or spondylitis would appear to be remote.  This is not going to respond therefore to her current regimen of methotrexate and hydroxychloroquine.  Her recent labs are within acceptable range.  This time I have asked her to return for follow-up sooner in 2 months.    Diagnoses and all orders for this visit:    Inflammatory polyarthritis    Acute bilateral low back pain with sciatica  -     traMADol (ULTRAM) 50 mg tablet; Take 1 tablet (50 mg total) by mouth every 8 (eight) hours as needed for pain.  Dispense: 90 tablet; Refill: 2    High risk medication use          HISTORY OF PRESENTING ILLNESS:  Essence Rubio 46 y.o. , a second , is here for follow up of seronegative inflammatory polyarthritis.  With the combination of hydroxychloroquine and methotrexate she seems to have turned the corner.  3 weeks ago she started hurting.  This is in the lower back, radiating down her lower extremity all the way and thighs knees and the calf.  This is bilateral.  This is at his best first thing in the morning and worse in the evening.  Moderately severe in intensity worse on the right side.  She rates the pain 7.0/10.  The upper extremity joints are doing remarkably well.  She has not had trauma or rash  "numbness or tingling.  There is some features suggestive of a similar phenomenon that she experienced several months ago when she had improvement with physical therapy.. She is a nonsmoker, alcohol rarely, no regular exercise, she gets she does not get a refreshing sleep at night. She has had fracture of her ankle or right sided, and 2004. She had bunion repair. She is status post hysteroscopy and ablation. She is unable to take the penicillin because of anaphylactic shock.   Further historical information, including ROS and limitation in  activities as noted in the multidimensional health assessment questionnaire scanned in the EMR and in the assessment and plan section.. ALLERGIES:Penicillins    PAST MEDICAL/ACTIVE PROBLEMS/MEDICATION/SOCIAL DATA  Past Medical History:   Diagnosis Date     Asthma     aggravated with colds     History of anesthesia complications      PONV (postoperative nausea and vomiting)      Rheumatoid arthritis      History   Smoking Status     Never Smoker   Smokeless Tobacco     Not on file     Patient Active Problem List   Diagnosis     Gallstone     Inflammatory polyarthritis     High risk medication use     S/P vaginal hysterectomy     Current Outpatient Prescriptions   Medication Sig Dispense Refill     acetaminophen (TYLENOL) 500 MG tablet Take 1,000 mg by mouth every 6 (six) hours as needed for pain.       cetirizine (ZYRTEC) 10 MG tablet Take 10 mg by mouth daily.       hydroxychloroquine (PLAQUENIL) 200 mg tablet TAKE 1 TABLET TWICE A  tablet 1     methotrexate 2.5 MG tablet TAKE 10 TABLETS ONCE A WEEK 120 tablet 0     No current facility-administered medications for this visit.        DETAILED EXAMINATION (six area) :  Vitals:    07/05/17 1528   BP: 120/68   Patient Site: Left Arm   Patient Position: Sitting   Cuff Size: Adult Regular   Weight: 205 lb (93 kg)   Height: 5' 7.5\" (1.715 m)     Alert oriented. Head including the face is examined for malar rash, heliotropes, " scarring, lupus pernio. Eyes examined for redness such as in episcleritis/scleritis, periorbital lesions.   Neck examined  for lymph nodes, range of motion Both upper and lower extremities (all four) examined for swollen, warm &/or  tender joints, range of motion, rash, muscle weakness, edema. The salient normal / abnormal findings are appended.   There is no palpable synovitis in any of the appendicular joints.  She has tenderness in her sciatic nerve line, lower lumbar area no tenderness in the trochanteric or ischial bursa region.  SLR normal.  Intact tone power DTRs lower extremities.  LAB / IMAGING DATA:  ALT   Date Value Ref Range Status   06/27/2017 22 0 - 45 U/L Final   03/23/2017 42 0 - 45 U/L Final   10/17/2016 27 0 - 45 U/L Final     Albumin   Date Value Ref Range Status   06/27/2017 4.1 3.5 - 5.0 g/dL Final   03/23/2017 3.9 3.5 - 5.0 g/dL Final   10/17/2016 4.2 3.5 - 5.0 g/dL Final     Creatinine   Date Value Ref Range Status   06/27/2017 0.76 0.60 - 1.10 mg/dL Final   03/23/2017 0.72 0.60 - 1.10 mg/dL Final   10/17/2016 0.75 0.60 - 1.10 mg/dL Final       WBC   Date Value Ref Range Status   06/27/2017 4.9 4.0 - 11.0 thou/uL Final   03/23/2017 5.0 4.0 - 11.0 thou/uL Final   09/10/2015 9.1 4.0 - 11.0 thou/uL Final     Hemoglobin   Date Value Ref Range Status   06/27/2017 14.0 12.0 - 16.0 g/dL Final   03/23/2017 12.6 12.0 - 16.0 g/dL Final   10/19/2016 12.9 12.0 - 16.0 g/dL Final     Platelets   Date Value Ref Range Status   06/27/2017 294 140 - 440 thou/uL Final   03/23/2017 301 140 - 440 thou/uL Final   10/19/2016 264 140 - 440 thou/uL Final       Lab Results   Component Value Date    RF <15.0 04/30/2015    SEDRATE 4 06/29/2016

## 2021-06-12 NOTE — PROGRESS NOTES
ASSESSMENT AND PLAN:  Essence Rubio 47 y.o. female has inflammatory polyarthritis is here for follow-up.  She describes a new phenomenon and during the recent past.  She has had intermittent pain lasting a day or so in the major joints of her lower extremities.  She also describes the sensation as if there is a neurologic issue causing discomfort between her hip and the knee area.  This is typically anterolateral.  She has trochanteric bursitis.  Today she noted that her sister has psoriasis.  We discussed psoriatic arthritis.  She requires further looking into this.  While the methotrexate and hydroxychloroquine seems to have done well for her upper extremity symptoms especially the inflammation of the small joints of the hands wrists these new issues require further looking into.  X-rays of the hips and knees to be taken today.  Labs as noted.  She wants to proceed with local injection trochanteric areas done with 40 mg of methylprednisolone each site under aseptic technique.    Diagnoses and all orders for this visit:    Inflammatory polyarthritis  -     Rheumatoid Factor Quant  -     CCP Antibodies  -     Hepatitis C Antibody (Anti-HCV)  -     C-Reactive Protein  -     Erythrocyte Sedimentation Rate  -     Uric Acid  -     Antinuclear Antibody (PRANEETH) Cascade  -     Hepatitis B Surface Antigen (HBsAG)  -     HLA-B27 Antigen  -     XR Knees Bilateral 1 Or 2 VWS; Future; Expected date: 8/30/17  -     XR Pelvis W 2 Vw Hips Bilateral; Future; Expected date: 8/30/17    High risk medication use    Trochanteric bursitis of both hips  -     methylPREDNISolone acetate injection 40 mg (DEPO-MEDROL); Inject 1 mL (40 mg total) into the joint once.  -     methylPREDNISolone acetate injection 40 mg (DEPO-MEDROL); Inject 1 mL (40 mg total) into the joint once.    Family history of psoriasis in sister  -     Rheumatoid Factor Quant  -     CCP Antibodies  -     Hepatitis C Antibody (Anti-HCV)  -     C-Reactive Protein  -      Erythrocyte Sedimentation Rate  -     Uric Acid  -     Antinuclear Antibody (PRANEETH) Cascade  -     Hepatitis B Surface Antigen (HBsAG)  -     HLA-B27 Antigen  -     XR Knees Bilateral 1 Or 2 VWS; Future; Expected date: 8/30/17  -     XR Pelvis W 2 Vw Hips Bilateral; Future; Expected date: 8/30/17          HISTORY OF PRESENTING ILLNESS:  Essence Rubio 47 y.o. , a second , is here for follow up of seronegative inflammatory polyarthritis.  With the combination of hydroxychloroquine and methotrexate she seems to have turned the corner.  3 weeks ago she started hurting.  This is in the lower back, radiating down her lower extremity all the way and thighs knees and the calf.  This is bilateral.  This is at his best first thing in the morning and worse in the evening.  Moderately severe in intensity worse on the right side.  She rates the pain 7.0/10.  The upper extremity joints are doing remarkably well.  She has not had trauma or rash numbness or tingling.  There is some features suggestive of a similar phenomenon that she experienced several months ago when she had improvement with physical therapy.. She is a nonsmoker, alcohol rarely, no regular exercise, she gets she does not get a refreshing sleep at night. She has had fracture of her ankle or right sided, and 2004. She had bunion repair. She is status post hysteroscopy and ablation. She is unable to take the penicillin because of anaphylactic shock.   Further historical information, including ROS and limitation in  activities as noted in the multidimensional health assessment questionnaire scanned in the EMR and in the assessment and plan section.. ALLERGIES:Penicillins    PAST MEDICAL/ACTIVE PROBLEMS/MEDICATION/SOCIAL DATA  Past Medical History:   Diagnosis Date     Asthma     aggravated with colds     History of anesthesia complications      PONV (postoperative nausea and vomiting)      Rheumatoid arthritis      History   Smoking Status      "Never Smoker   Smokeless Tobacco     Not on file     Patient Active Problem List   Diagnosis     Gallstone     Inflammatory polyarthritis     High risk medication use     S/P vaginal hysterectomy     Acute bilateral low back pain with sciatica     Current Outpatient Prescriptions   Medication Sig Dispense Refill     acetaminophen (TYLENOL) 500 MG tablet Take 1,000 mg by mouth every 6 (six) hours as needed for pain.       cetirizine (ZYRTEC) 10 MG tablet Take 10 mg by mouth daily.       folic acid (FOLVITE) 1 MG tablet TAKE 1 TABLET DAILY 90 tablet 3     hydroxychloroquine (PLAQUENIL) 200 mg tablet TAKE 1 TABLET TWICE A  tablet 1     methotrexate 2.5 MG tablet TAKE 10 TABLETS ONCE A WEEK 120 tablet 0     No current facility-administered medications for this visit.        DETAILED EXAMINATION (six area) :  Vitals:    08/30/17 1324   BP: 130/72   Weight: 206 lb (93.4 kg)   Height: 5' 7.5\" (1.715 m)     Alert oriented. Head including the face is examined for malar rash, heliotropes, scarring, lupus pernio. Eyes examined for redness such as in episcleritis/scleritis, periorbital lesions.   Neck examined  for lymph nodes, range of motion Both upper and lower extremities (all four) examined for swollen, warm &/or  tender joints, range of motion, rash, muscle weakness, edema. The salient normal / abnormal findings are appended.   There is no palpable synovitis in any of the appendicular joints.  She has tenderness in the trochanteric areas bilaterally, this is worse with abduction of the hip joints.  There is no warmth or swelling of the knees ankles.  Full range of motion there and.  LAB / IMAGING DATA:  ALT   Date Value Ref Range Status   08/28/2017 45 0 - 45 U/L Final   06/27/2017 22 0 - 45 U/L Final   03/23/2017 42 0 - 45 U/L Final     Albumin   Date Value Ref Range Status   08/28/2017 3.8 3.5 - 5.0 g/dL Final   06/27/2017 4.1 3.5 - 5.0 g/dL Final   03/23/2017 3.9 3.5 - 5.0 g/dL Final     Creatinine   Date Value " Ref Range Status   08/28/2017 0.74 0.60 - 1.10 mg/dL Final   06/27/2017 0.76 0.60 - 1.10 mg/dL Final   03/23/2017 0.72 0.60 - 1.10 mg/dL Final       WBC   Date Value Ref Range Status   08/28/2017 5.3 4.0 - 11.0 thou/uL Final   06/27/2017 4.9 4.0 - 11.0 thou/uL Final   09/10/2015 9.1 4.0 - 11.0 thou/uL Final     Hemoglobin   Date Value Ref Range Status   08/28/2017 13.0 12.0 - 16.0 g/dL Final   06/27/2017 14.0 12.0 - 16.0 g/dL Final   03/23/2017 12.6 12.0 - 16.0 g/dL Final     Platelets   Date Value Ref Range Status   08/28/2017 251 140 - 440 thou/uL Final   06/27/2017 294 140 - 440 thou/uL Final   03/23/2017 301 140 - 440 thou/uL Final       Lab Results   Component Value Date    RF <15.0 04/30/2015    SEDRATE 4 06/29/2016

## 2021-06-13 NOTE — PROGRESS NOTES
ASSESSMENT AND PLAN:  Essence Rubio 47 y.o. female is here for follow-up of inflammatory polyarthritis doing great with the current combination of methotrexate and hydroxychloroquine.  Her main concern is around her hip area pains which she has features suggestive of piriformis syndrome.  We discussed how this is different from trochanteric bursitis.  I have given the option of physical therapy.  She would like to proceed with that.  I also showed her the diagrams to explain what piriformis syndrome is and where with this muscle lies.  Her sister has psoriasis.  We will continue methotrexate and hydroxychloroquine as now.  She is going to review literature and psoriatic arthritis.  We will meet here in 4 months with labs every 2 months.    Diagnoses and all orders for this visit:    Inflammatory polyarthritis    Piriformis syndrome, unspecified laterality  -     Ambulatory referral to PT/OT    High risk medication use          HISTORY OF PRESENTING ILLNESS:  Essence Rubio 47 y.o. , a second , is here for follow up of seronegative inflammatory polyarthritis.  With the combination of hydroxychloroquine and methotrexate she seems to have turned the corner.  She has very little if any symptoms of the upper extremities.  The bulk of her symptoms are in the lower extremities.  She noted pain in the hip areas.  This is noted to be moderately severe.  Sometimes radiating down the leg to the calf region.  This is associated with knee pain as well.  She had trochanteric bursitis that responded to local injection for a few weeks and then her current symptoms began.  This is interfering with some of the day-to-day activities.  She rates that are 7.0/10.  She has noted no fever or weight loss blurry vision eye redness mouth or some nausea cough or rash. She has not had trauma or rash numbness or tingling.  There is some features suggestive of a similar phenomenon that she experienced several months ago  "when she had improvement with physical therapy.. She is a nonsmoker, alcohol rarely, no regular exercise, she gets she does not get a refreshing sleep at night. She has had fracture of her ankle or right sided, and 2004. She had bunion repair. She is status post hysteroscopy and ablation. She is unable to take the penicillin because of anaphylactic shock. Further historical information, including ROS and limitation in  activities as noted in the multidimensional health assessment questionnaire scanned in the EMR and in the assessment and plan section.. ALLERGIES:Penicillins    PAST MEDICAL/ACTIVE PROBLEMS/MEDICATION/SOCIAL DATA  Past Medical History:   Diagnosis Date     Asthma     aggravated with colds     History of anesthesia complications      PONV (postoperative nausea and vomiting)      Rheumatoid arthritis      History   Smoking Status     Never Smoker   Smokeless Tobacco     Not on file     Patient Active Problem List   Diagnosis     Gallstone     Inflammatory polyarthritis     High risk medication use     S/P vaginal hysterectomy     Trochanteric bursitis of both hips     Acute bilateral low back pain with sciatica     Family history of psoriasis in sister     Current Outpatient Prescriptions   Medication Sig Dispense Refill     acetaminophen (TYLENOL) 500 MG tablet Take 1,000 mg by mouth every 6 (six) hours as needed for pain.       cetirizine (ZYRTEC) 10 MG tablet Take 10 mg by mouth daily.       folic acid (FOLVITE) 1 MG tablet TAKE 1 TABLET DAILY 90 tablet 3     hydroxychloroquine (PLAQUENIL) 200 mg tablet TAKE 1 TABLET TWICE A  tablet 1     methotrexate 2.5 MG tablet Take 10 tablets (25 mg total) by mouth once a week. 120 tablet 0     No current facility-administered medications for this visit.        DETAILED EXAMINATION (six area) :  Vitals:    10/17/17 1617   BP: 130/78   Pulse: 80   Resp: 12   Weight: 205 lb (93 kg)   Height: 5' 7.5\" (1.715 m)     Alert oriented. Head including the face is " examined for malar rash, heliotropes, scarring, lupus pernio. Eyes examined for redness such as in episcleritis/scleritis, periorbital lesions.   Neck examined  for lymph nodes, range of motion Both upper and lower extremities (all four) examined for swollen, warm &/or  tender joints, range of motion, rash, muscle weakness, edema. The salient normal / abnormal findings are appended.   There is no palpable synovitis in any of the appendicular joints.  She has a positive piriformis stretch bilaterally.  There is minimal if any trochanteric area tenderness.  Mild tenderness of the right knee joint line.  There is no dactylitis, enthesitis.      LAB / IMAGING DATA:  ALT   Date Value Ref Range Status   09/20/2017 20 0 - 45 U/L Final   08/28/2017 45 0 - 45 U/L Final   06/27/2017 22 0 - 45 U/L Final     Albumin   Date Value Ref Range Status   09/20/2017 3.9 3.5 - 5.0 g/dL Final   08/28/2017 3.8 3.5 - 5.0 g/dL Final   06/27/2017 4.1 3.5 - 5.0 g/dL Final     Creatinine   Date Value Ref Range Status   09/20/2017 0.74 0.60 - 1.10 mg/dL Final   08/28/2017 0.74 0.60 - 1.10 mg/dL Final   06/27/2017 0.76 0.60 - 1.10 mg/dL Final       WBC   Date Value Ref Range Status   09/20/2017 4.7 4.0 - 11.0 thou/uL Final   08/28/2017 5.3 4.0 - 11.0 thou/uL Final   09/10/2015 9.1 4.0 - 11.0 thou/uL Final     Hemoglobin   Date Value Ref Range Status   09/20/2017 12.8 12.0 - 16.0 g/dL Final   08/28/2017 13.0 12.0 - 16.0 g/dL Final   06/27/2017 14.0 12.0 - 16.0 g/dL Final     Platelets   Date Value Ref Range Status   09/20/2017 275 140 - 440 thou/uL Final   08/28/2017 251 140 - 440 thou/uL Final   06/27/2017 294 140 - 440 thou/uL Final       Lab Results   Component Value Date    RF <15.0 08/30/2017    SEDRATE 5 08/30/2017

## 2021-06-14 ENCOUNTER — RECORDS - HEALTHEAST (OUTPATIENT)
Dept: ADMINISTRATIVE | Facility: OTHER | Age: 51
End: 2021-06-14

## 2021-06-14 DIAGNOSIS — M06.4 INFLAMMATORY POLYARTHRITIS (H): ICD-10-CM

## 2021-06-14 RX ORDER — FOLIC ACID 1 MG/1
TABLET ORAL
Qty: 90 TABLET | Refills: 1 | Status: SHIPPED | OUTPATIENT
Start: 2021-06-14 | End: 2021-11-05

## 2021-06-14 NOTE — TELEPHONE ENCOUNTER
Pt called and is needing a refill for her methotrexate.     She is getting labs drawn today.     [facility] at [email or web address].

## 2021-06-14 NOTE — PROGRESS NOTES
[facility] Daily Progress     Patient Name: Essence Rubio  Date: 11/29/2017  Visit #: 3/12  Referral Diagnosis: piriformis syndrome  Referring provider: Sher Ho MBBS  Visit Diagnosis:     ICD-10-CM    1. Tendinopathy of left gluteus medius M67.952    2. Tendinopathy of right gluteus medius M67.98        Assessment:     Patient is a 47 y.o. female that presents with signs and symptoms consistent with bilateral hip pain secondary to arthritis and possible bilateral glute med tendinopathy. Patient demonstrates impairments including decreased BLE flexibility with increased pain to palpation of glute med, pain with hip IR, ER and clamshell exercise, leading to impaired functional mobility. Patient's functional limitations include stair ambulation, sleeping comfortably, going from sitting to standing, and walking for longer than 5 minutes.    Today patient reports she feels the same as last visit. PT and patient discussed to continue with therapy for the next two visits and if nothing has changed, possibly send back to referring provider for follow up. Patient states she feels worse than initial visit, feeling more stiffness in her legs and is unable to sleep appropriately at night, which is her biggest difficulty.    HEP/POC compliance is  good .  Patient demonstrates understanding/independence with home program.  Patient is appropriate to continue with skilled physical therapy intervention, as indicated by initial plan of care.    Goal Status:  Pt. will be independent with home exercise program in : 4 weeks  Pt. will have improved quality of sleep: with less pain;waking less times/night;in 6 weeks  Pt. will be able to walk : 10 minutes;with no pain;with less difficulty;for household mobility;for community mobility;in 6 weeks  Pt. will bend: to dress;to clean;to do yard work;with no pain;with less difficulty;in 6 weeks;for self care  Patient will ascend / descend: stairs;without  railing;independently;with no pain;with less difficulty;in 6 weeks  Pt will: demonstrate no TTP of bilateral glute med by 5 weeks.      Plan / Patient Education:     Continue with initial plan of care.  Progress with home program as tolerated.    Plan for next visit: review HEP, hip extension/abduction, MFR to glute muscles, hip flexor stretch, prone glutes, lateral step down    Subjective:     Pain Ratin-5  Patient reports she feels about the same. She notices that when she does all the exercises at the same time at night she feels that her night goes a little worse. She is having some pains in both knees. She feels stiffness all the time, she feels it throughout her legs. She hasn't really been having good and bad days, it's been mostly bad. She feels the worst at night, and then wakes up 2-3x/night and in the morning she can feel it increase as well. She feels it the same on the L and R. Exercises help with the stiffness but then the pain and problem comes right back. Patient feels increased pain after manual therapy treatments.     Functional limitations are described as occurring with:   ascending and descending stairs or curbs  Sleeping - biggest difficulty still  transitional movements getting in  chair and car, getting out of  chair and car and sit to stand  walking for longer than 5 minutes    Objective:     LE Flexibility: decreased of hamstrings, HF, gastroc/soleus, piriformis  Palpation: TTP of bilateral glute med, ITB and lateral quad    Treatment Today       Patient Education: Patient was educated on continuing plan of care, progress and review of current HEP. Patient educated on importance of consistency with exercise and therapy, as well as activity modification in order to see change and improvements. Patient demonstrated and verbalized understanding.     Manual Therapy:  STM to bilateral piriformis and QL in prone, L ITB and glute med in sidelying on R    Exercises:  Exercise #1: supine  piriformis stretch - hold 30 sec x 2  Comment #1: supine KTC - hold 30 sec x 2  Exercise #2: supine QL stretch - hold 30 sec x 2  Comment #2: standing ITB stretch - hold 30 sec x 2  Exercise #3: supine bridge with hip addution - 10 reps x 2      TREATMENT MINUTES COMMENTS   Evaluation     Self-care/ Home management     Manual therapy 23 STM to bilateral ITB and hamstring in supine with and without bolster under knee - increased pain at ITB attachment bilaterally  Manual hamstring stretch and nerve glides bilaterally in prone and supine   Neuromuscular Re-education     Therapeutic Activity     Therapeutic Exercises 5 See above flowsheet; 5 minute recumbent bike, review of bridge and modification   Gait training     Modality__________________                Total 28    Blank areas are intentional and mean the treatment did not include these items.       Keila Granados, PT  11/29/2017

## 2021-06-14 NOTE — PROGRESS NOTES
[facility] Daily Progress     Patient Name: Essence Rubio  Date: 11/22/2017  Visit #: 2/12  Referral Diagnosis: piriformis syndrome  Referring provider: Sher oH MBBS  Visit Diagnosis:     ICD-10-CM    1. Tendinopathy of left gluteus medius M67.952    2. Tendinopathy of right gluteus medius M67.98        Assessment:     Patient is a 47 y.o. female that presents with signs and symptoms consistent with bilateral hip pain secondary to arthritis and possible bilateral glute med tendinopathy. Patient demonstrates impairments including decreased BLE flexibility with increased pain to palpation of glute med, pain with hip IR, ER and clamshell exercise, leading to impaired functional mobility. Patient's functional limitations include stair ambulation, sleeping comfortably, going from sitting to standing, and walking for longer than 5 minutes.    Today patient hasn't been seen since 11/7 evaluation. Patient states she feels worse than initial visit, feeling more stiffness in her legs and is unable to sleep appropriately at night.    HEP/POC compliance is  good .  Patient demonstrates understanding/independence with home program.  Patient is appropriate to continue with skilled physical therapy intervention, as indicated by initial plan of care.    Goal Status:  Pt. will be independent with home exercise program in : 4 weeks  Pt. will have improved quality of sleep: with less pain;waking less times/night;in 6 weeks  Pt. will be able to walk : 10 minutes;with no pain;with less difficulty;for household mobility;for community mobility;in 6 weeks  Pt. will bend: to dress;to clean;to do yard work;with no pain;with less difficulty;in 6 weeks;for self care  Patient will ascend / descend: stairs;without railing;independently;with no pain;with less difficulty;in 6 weeks  Pt will: demonstrate no TTP of bilateral glute med by 5 weeks.      Plan / Patient Education:     Continue with initial plan of care.  Progress  with home program as tolerated.    Plan for next visit: review HEP, hip extension/abduction, MFR to glute muscles, hip flexor stretch, prone glutes, lateral step down    Subjective:     Pain Ratin  Patient reports she feels a little bit worse and the pain is back. She feels stiffness all the time, she feels it throughout her legs. She hasn't really been having good and bad days, it's been mostly bad. She feels the worst at night, and then wakes up 2-3x/night and in the morning she can feel it increase as well. She feels it the same on the L and R. Exercises help with the stiffness but then the pain and problem comes right back.     Functional limitations are described as occurring with:   ascending and descending stairs or curbs  sleeping  transitional movements getting in  chair and car, getting out of  chair and car and sit to stand  walking for longer than 5 minutes    Objective:     LE Flexibility: decreased of hamstrings, HF, gastroc/soleus, piriformis  Palpation: TTP of bilateral glute med, ITB and lateral quad    Treatment Today       Patient Education: Patient was educated on continuing plan of care, progress and review of current HEP. Patient educated on importance of consistency with exercise and therapy, as well as activity modification in order to see change and improvements. Patient demonstrated and verbalized understanding.     Manual Therapy:  STM to bilateral piriformis and QL in prone, L ITB and glute med in sidelying on R    Exercises:  Exercise #1: supine piriformis stretch - hold 30 sec x 2  Comment #1: supine KTC - hold 30 sec x 2  Exercise #2: supine QL stretch - hold 30 sec x 2  Comment #2: standing ITB stretch - hold 30 sec x 2  Exercise #3: supine bridge with hip addution - 10 reps x 2      TREATMENT MINUTES COMMENTS   Evaluation     Self-care/ Home management     Manual therapy 15 STM to bilateral piriformis and QL in prone, L ITB and glute med in sidelying on R   Neuromuscular  Re-education     Therapeutic Activity     Therapeutic Exercises 15 See above flowsheet; 5 minute nustep, added hip flexor stretch and Gastroc stretch to HEP   Gait training     Modality__________________                Total 30 Patient arrived 4 minutes late   Blank areas are intentional and mean the treatment did not include these items.       Keila Granados, PT  11/22/2017

## 2021-06-14 NOTE — PROGRESS NOTES
[facility] Daily Progress     Patient Name: Essence Rubio  Date: 12/5/2017  Visit #: 4/12  Referral Diagnosis: piriformis syndrome  Referring provider: Sher Ho MBBS  Visit Diagnosis:     ICD-10-CM    1. Tendinopathy of left gluteus medius M67.952    2. Tendinopathy of right gluteus medius M67.98        Assessment:     Patient is a 47 y.o. female that presents with signs and symptoms consistent with bilateral hip pain secondary to arthritis and possible bilateral glute med tendinopathy. Patient demonstrates impairments including decreased BLE flexibility with increased pain to palpation of glute med, pain with hip IR, ER and clamshell exercise, leading to impaired functional mobility. Patient's functional limitations include stair ambulation, sleeping comfortably, going from sitting to standing, and walking for longer than 5 minutes.    Today patient reports she feels the same as last visit. PT and patient discussed to continue with therapy for the next visit and if nothing has changed, possibly send back to referring provider for follow up. Patient states she feels worse than initial visit, feeling more stiffness in her legs and is unable to sleep appropriately at night, which is her biggest difficulty.    HEP/POC compliance is  good .  Patient demonstrates understanding/independence with home program.  Patient is appropriate to continue with skilled physical therapy intervention, as indicated by initial plan of care.    Goal Status:  Pt. will be independent with home exercise program in : 4 weeks  Pt. will have improved quality of sleep: with less pain;waking less times/night;in 6 weeks  Pt. will be able to walk : 10 minutes;with no pain;with less difficulty;for household mobility;for community mobility;in 6 weeks  Pt. will bend: to dress;to clean;to do yard work;with no pain;with less difficulty;in 6 weeks;for self care  Patient will ascend / descend: stairs;without railing;independently;with  no pain;with less difficulty;in 6 weeks  Pt will: demonstrate no TTP of bilateral glute med by 5 weeks.      Plan / Patient Education:     Continue with initial plan of care.  Progress with home program as tolerated.    Plan for next visit: review HEP, hip extension/abduction, MFR to glute muscles, hip flexor stretch, prone glutes, lateral step down    Subjective:     Pain Ratin/10 where this last week was 8-9/10 pain - debilitating, pain was only on the L   Patient reports she feels about the same. On Friday her low back seized up with super sharp pains and it felt like someone was grabbing her body, she tried to take a step and she couldn't, she had to hold herself up with her arms. She laid down and that pain subsided but it came on again once she stood up again. She hadn't done the stretches then. Thursday she was feeling that her muscles were feeling pretty stiff. She feels the worst at night, and then wakes up 2-3x/night and in the morning she can feel it increase as well. She feels it the same on the L and R. Exercises help with the stiffness but then the pain and problem comes right back.    Functional limitations are described as occurring with:   ascending and descending stairs or curbs  Sleeping - biggest difficulty still  transitional movements getting in  chair and car, getting out of  chair and car and sit to stand  walking for longer than 5 minutes    Objective:     LE Flexibility: decreased of hamstrings, HF, gastroc/soleus, piriformis  Palpation: TTP of bilateral glute med, ITB and lateral quad    Treatment Today       Patient Education: Patient was educated on continuing plan of care, progress and review of current HEP. Patient educated on importance of consistency with exercise and therapy, as well as activity modification in order to see change and improvements. Patient demonstrated and verbalized understanding.     Exercises:  Exercise #1: supine piriformis stretch - hold 30 sec x 2  Comment  #1: supine KTC - hold 30 sec x 2  Exercise #2: supine QL stretch - hold 30 sec x 2  Comment #2: standing ITB stretch - hold 30 sec x 2  Exercise #3: supine bridge with hip addution - 10 reps x 2  Comment #3: gastroc stretch and HF stretch - hold 30 sec x 2      TREATMENT MINUTES COMMENTS   Evaluation     Self-care/ Home management     Manual therapy 20 Retesting of lumbar back problems as patient was reporting QL pains   STM to L>R QL distal>proximal   Prone PA of lumbar spine and sacrum - increased pressure R>L   Neuromuscular Re-education     Therapeutic Activity     Therapeutic Exercises 10 See above flowsheet;   QL stretching and review of HF stretch   Gait training     Modality__________________                Total 30    Blank areas are intentional and mean the treatment did not include these items.       Keila Granados, PT  12/5/2017

## 2021-06-14 NOTE — PROGRESS NOTES
[facility]   Hip Initial Evaluation    Patient Name: Essence Rubio  Date of evaluation: 11/7/2017  Referral Diagnosis: Piriformis syndrome, unspecified laterality  Referring provider: Sher Ho MBBS  Visit Diagnosis:     ICD-10-CM    1. Tendinopathy of left gluteus medius M67.952    2. Tendinopathy of right gluteus medius M67.98        Assessment:      Patient is a 47 y.o. female that presents with signs and symptoms consistent with bilateral hip pain secondary to arthritis and possible bilateral glute med tendinopathy. Patient demonstrates impairments including decreased BLE flexibility with increased pain to palpation of glute med, pain with hip IR, ER and clamshell exercise, leading to impaired functional mobility. Patient's functional limitations include stair ambulation, sleeping comfortably, going from sitting to standing, and walking for longer than 5 minutes. Today patient responded well to manual therapy and therapeutic exercise.  Patient educated on and demonstrated understanding of nature of impairment, plan of care, patient role and HEP. Patient compliant with PT and prognosis is good. Patient would benefit from skilled PT to progress and improve above impairments.    The POC is dynamic and will be modified on an ongoing basis.  Patient will return to clinic if symptoms persist.  Barriers to achieving goals as noted in the assessment section may affect outcome.  Prognosis to achieve goals is  good   Pt. is appropriate for skilled PT intervention as outlined in the Plan of Care (POC).  Pt. is a good candidate for skilled PT services to improve pain levels and function.    Goals:  Pt. will be independent with home exercise program in : 4 weeks  Pt. will have improved quality of sleep: with less pain;waking less times/night;in 6 weeks  Pt. will be able to walk : 10 minutes;with no pain;with less difficulty;for household mobility;for community mobility;in 6 weeks  Pt. will bend: to dress;to  clean;to do yard work;with no pain;with less difficulty;in 6 weeks;for self care  Patient will ascend / descend: stairs;without railing;independently;with no pain;with less difficulty;in 6 weeks  Pt will: demonstrate no TTP of bilateral glute med by 5 weeks.    Patient's expectations/goals are realistic.    Barriers to Learning or Achieving Goals:  No Barriers.  Chronicity of the problem.       Plan / Patient Instructions:        Plan of Care:   Communication with: Referral Source  Patient Related Instruction: Nature of Condition;Treatment plan and rationale;Self Care instruction;Basis of treatment;Body mechanics;Posture;Expected outcome;Next steps  Times per Week: 1-2  Number of Weeks: 6  Number of Visits: 12  Therapeutic Exercise: Stretching;ROM;Strengthening  Neuromuscular Reeducation: posture;kinesio tape;balance/proprioception;core  Manual Therapy: myofascial release;joint mobilization;soft tissue mobilization;muscle energy  Modalities: TENS  Gait Training: as indicated    POC and pathology of condition were reviewed with patient.  Pt. is in agreement with the Plan of Care  A Home Exercise Program (HEP) was initiated today.  Pt. was instructed in exercises by PT and patient was given a handout with detailed instructions.    Plan for next visit: review HEP, hip extension/abduction, MFR to glute muscles, hip flexor stretch, prone glutes, lateral step down     Subjective:      History of Present Illness:    Essence is a 47 y.o. female who presents to therapy today with complaints of bilateral hip pain L>R. Date of onset is previously this summer, she had a cortizone shot in each of the hips which worked well for about 2 weeks and onset was gradual. Symptoms are constant and not improving. Patient reports she had PT for sciatica last fall and that worked really well. She does have arthritis, so her hips, knees and ankles are in pain all the time, she feels sometimes she has shin splints. She denies history of  similar symptoms. She describes their previous level of function as not limited.    Pain Rating:3  Pain rating at best: 3  Pain rating at worst: 7  Pain description: burning and stiffness of both hips     Functional limitations are described as occurring with:   ascending and descending stairs or curbs  sleeping  transitional movements getting in  chair and car, getting out of  chair and car and sit to stand  walking for longer than 5 minutes    Patient reports benefit from:  stretching helps with the stiffness, she does them every night, tylenol will help a little and heat helps       Objective:      Note: Items left blank indicates the item was not performed or not indicated at the time of the evaluation.      Hip Examination  1. Tendinopathy of left gluteus medius     2. Tendinopathy of right gluteus medius       Involved Side: Left and Bilateral  Posture Observation:      General sitting posture is  normal.  General standing posture is normal.  Gait Observation: limping by the afternoon, and when she wakes up in the morning due to stiffness  Lumbar Clearing: Does not provoke symptoms    Hip ROM:  WFL unless noted  Date: 11/7/2017     Hip ROM( ) AROM in degrees AROM in degrees AROM in degrees    Right Left Right Left Right Left   Hip Flexion (0-120 ) P at end range P at end range       Hip Abduction (0-45 )         Hip External Rotation (0-50 )         Hip Internal Rotation (0-40 )         Hip Extension (0-15 )          PROM in degrees PROM in degrees PROM in degrees    Right Left Right Left Right Left   Hip Flexion (0-120 )         Hip Abduction (0-45 )         Hip External Rotation (0-50 ) P P       Hip Internal Rotation (0-40 ) P P       Hip Extension (0-15 )           Hip/Knee Strength WFL unless noted  Date: 11/7/2017     Hip/Knee Strength (/5) MMT MMT MMT    Right Left Right Left Right Left   Hip Flexion         Hip Abduction         Hip Adduction         Hip Extension         Hip External Rotation          Hip Internal Rotation         Knee Extension         Knee Flexion           Hip Special Tests     OA Right (+/-) Left (+/-) Intra Articular Right (+/-) Left (+/-)   Hip Scour - - BLESSING + +   Test Cluster  -Hip pain  -Hip IR <15   -Hip Flex <115    FADIR + +   Test Cluster  -Painful Hip IR  ->50 years old  -Morning Stiffness <60 min + + Passive Supine Rotation Test     Misc. Right (+/-) Left (+/-) Stinchfield Test (SLR Against Resistance)     Ely s   DEXRIT     Otto s   DIRI     Trendelenburg   Posterior Rim Impingement     SIJ Right (+/-) Left (+/-) Lateral Rim Impingement     SIJ Compression   Other     SIJ Distraction   Other     POSH Test   Other     Sacral Thrust   Other       LE Flexibility: decreased of hamstrings, HF, gastroc/soleus, piriformis  Palpation: TTP of bilateral glute med, ITB and lateral quad    Treatment Today       Patient Education: Patient educated on plan of care, prognosis, PT/patient role and HEP. Patient educated on impairments related to condition and reproduction of symptoms. Patient instructed to focus on the small goals and this may be a long process to recovery, and that exercises at home are just as important as coming to therapy. Patient was educated on importance of activity modification and consistency of exercise. Patient demonstrated and verbalized understanding.     Manual Therapy:  None performed today    Exercises:  Exercise #1: supine piriformis stretch - hold 30 sec x 2  Comment #1: supine KTC - hold 30 sec x 2  Exercise #2: supine QL stretch - hold 30 sec x 2  Comment #2: standing ITB stretch - hold 30 sec x 2  Exercise #3: supine bridge with hip addution - 10 reps x 2    TREATMENT MINUTES COMMENTS   Evaluation 15    Self-care/ Home management     Manual therapy     Neuromuscular Re-education     Therapeutic Activity     Therapeutic Exercises 23 See Flowsheet   Gait training     Modality__________________                Total 38    Blank areas are intentional and mean  the treatment did not include these items.       PT Evaluation Code: (Please list factors)  Patient History/Comorbidities: bilateral hip pain, arthritis  Examination: decreased flexibility, tissue extensibility, joint mobility with increased pain  Clinical Presentation: uncomplicated  Clinical Decision Making: low    Patient History/  Comorbidities Examination  (body structures and functions, activity limitations, and/or participation restrictions) Clinical Presentation Clinical Decision Making (Complexity)   No documented Comorbidities or personal factors 1-2 Elements Stable and/or uncomplicated Low   1-2 documented comorbidities or personal factor 3 Elements Evolving clinical presentation with changing characteristics Moderate   3-4 documented comorbidities or personal factors 4 or more Unstable and unpredictable High              Keila Granados, PT  11/7/2017  4:03 PM

## 2021-06-14 NOTE — PROGRESS NOTES
[facility] Discharge Summary  Patient Name: Essence Rubio  Date: 1/22/2018  Referral Diagnosis: piriformis syndrome  Referring provider: Sher Ho MBBS  Visit Diagnosis:   1. Tendinopathy of left gluteus medius     2. Tendinopathy of right gluteus medius         Goals:  Pt. will be independent with home exercise program in : 4 weeks  Pt. will have improved quality of sleep: with less pain;waking less times/night;in 6 weeks  Pt. will be able to walk : 10 minutes;with no pain;with less difficulty;for household mobility;for community mobility;in 6 weeks  Pt. will bend: to dress;to clean;to do yard work;with no pain;with less difficulty;in 6 weeks;for self care  Patient will ascend / descend: stairs;without railing;independently;with no pain;with less difficulty;in 6 weeks  Pt will: demonstrate no TTP of bilateral glute med by 5 weeks.    Patient was seen for 5 visits for physical therapy of piriformis syndrome from 11/7/17 to 12/12/17 with no follow up appointments.   The patient was instructed to follow up with physician's clinic.  No further therapy is required at this time.    Therapy will be discontinued at this time.  The patient will need a new referral to resume physical therapy treatment. Please see below for patient's current status.    Thank you for your referral.  Keila Granados, PT, DPT  1/22/2018   11:20 AM      [facility] Daily Progress     Patient Name: Essence Rubio  Date: 12/12/2017  Visit #: 5/12  Referral Diagnosis: piriformis syndrome  Referring provider: Sher Ho MBBS  Visit Diagnosis:     ICD-10-CM    1. Tendinopathy of left gluteus medius M67.952    2. Tendinopathy of right gluteus medius M67.98        Assessment:     Patient is a 47 y.o. female that presents with signs and symptoms consistent with bilateral hip pain secondary to arthritis and possible bilateral glute med tendinopathy. Patient demonstrates impairments including decreased BLE flexibility with  increased pain to palpation of glute med, pain with hip IR, ER and clamshell exercise, leading to impaired functional mobility. Patient's functional limitations include stair ambulation, sleeping comfortably, going from sitting to standing, and walking for longer than 5 minutes.    Today patient reports she feels the same as last visit. PT and patient discussed to continue with therapy for the next visit and if nothing has changed, possibly send back to referring provider for follow up. Patient states she feels worse than initial visit, feeling more stiffness in her legs and is unable to sleep appropriately at night, which is her biggest difficulty.    HEP/POC compliance is  good .  Patient demonstrates understanding/independence with home program.  Patient is appropriate to continue with skilled physical therapy intervention, as indicated by initial plan of care.    Goal Status:  Pt. will be independent with home exercise program in : 4 weeks  Pt. will have improved quality of sleep: with less pain;waking less times/night;in 6 weeks  Pt. will be able to walk : 10 minutes;with no pain;with less difficulty;for household mobility;for community mobility;in 6 weeks  Pt. will bend: to dress;to clean;to do yard work;with no pain;with less difficulty;in 6 weeks;for self care  Patient will ascend / descend: stairs;without railing;independently;with no pain;with less difficulty;in 6 weeks  Pt will: demonstrate no TTP of bilateral glute med by 5 weeks.      Plan / Patient Education:     Continue with initial plan of care.  Progress with home program as tolerated.    Plan for next visit: review HEP, hip extension/abduction, MFR to glute muscles, hip flexor stretch, prone glutes, lateral step down    Subjective:      Within the last week she is starting to feel better. Last week she when she was feeling some of that back pain she stopped doing some of her exercises. And then when she added the exercises back in, the pain came  back last night and with this morning. The pain did not increase at all today. She feels the worst at night, and then wakes up 2-3x/night and in the morning she can feel it increase as well. She feels it more on her L side.     Functional limitations are described as occurring with:   ascending and descending stairs or curbs  Sleeping - biggest difficulty still  transitional movements getting in  chair and car, getting out of  chair and car and sit to stand  walking for longer than 5 minutes    Objective:     LE Flexibility: decreased of hamstrings, HF, gastroc/soleus, piriformis  Palpation: TTP of bilateral glute med, ITB and lateral quad    Treatment Today       Patient Education: Patient was educated on continuing plan of care, progress and review of current HEP. Patient educated on importance of consistency with exercise and therapy, as well as activity modification in order to see change and improvements. Patient demonstrated and verbalized understanding.     Exercises:  Exercise #1: supine piriformis stretch - hold 30 sec x 2  Comment #1: supine KTC - hold 30 sec x 2  Exercise #2: supine QL stretch - hold 30 sec x 2  Comment #2: standing ITB stretch - hold 30 sec x 2  Exercise #3: supine bridge with hip addution - 10 reps x 2  Comment #3: gastroc stretch and HF stretch - hold 30 sec x 2  Exercise #4: seated sidebending QL stretch - hold 20 sec x 2  Comment #4: standing lat stretch at sink - hold 20 sec x 3      TREATMENT MINUTES COMMENTS   Evaluation     Self-care/ Home management     Manual therapy 15 STM to L>R QL distal>proximal   Prone PA of lumbar spine and sacrum - increased pressure R>L   Neuromuscular Re-education     Therapeutic Activity     Therapeutic Exercises 10 See above flowsheet; review of HEP  Added new piriformis stretch and glute med stretch   Gait training     Modality__________________                Total 25 Patient arrived 5 min late   Blank areas are intentional and mean the treatment  did not include these items.       Keila Granados, PT  12/12/2017

## 2021-06-15 NOTE — PROGRESS NOTES
ASSESSMENT AND PLAN:  Essence Rubio 47 y.o. female is here for follow-up of bilateral hip/buttock area pain felt to be piriformis syndrome on her previous visit.  Physical therapy has not helped.  She is been experiencing some pain since fall of last year.  She has inflammatory arthropathy, family history of psoriasis, other joint areas doing great with methotrexate hydroxychloroquine combination.  She continues to exhibit sufficient features of piriformis syndrome.  She does have some tenderness in the trochanteric area.  I would recommend an MRI of the pelvic area.  The key question here to be if there is enthesitis that we can discover and then him that with imaged injections or upgrading her inflammatory arthropathy medications.  One option may be to give her nonsteroidals.  Once the MR data is available further course to be charted accordingly.    Diagnoses and all orders for this visit:    Piriformis syndrome, unspecified laterality  -     MRV Pelvis With Without Contrast; Future; Expected date: 1/5/18  -     MRV Pelvis With Without Contrast    Inflammatory polyarthritis  -     MRV Pelvis With Without Contrast; Future; Expected date: 1/5/18  -     MRV Pelvis With Without Contrast    Family history of psoriasis in sister  -     MRV Pelvis With Without Contrast; Future; Expected date: 1/5/18  -     MRV Pelvis With Without Contrast          HISTORY OF PRESENTING ILLNESS:  Essence Rubio 47 y.o. , a second , is here for ongoing pain.  This is in the bilateral buttock area.  She rates this pain as 7.5/10.  Moderately severe.  This is waking her up from sleep.  Repeatedly 2 or 3 times each night.  She was seen here for this in October physical therapy was initiated that has not helped.  There is no radiation down the legs.  She can be woken up from sleep irrespective of which side she is laying down or on her back.  During the day this also troublesome but not as much.  There is no numbness  or tingling no trauma.  She has had trochanteric bursitis for which she was injected and had 3 weeks response.  That pain she can identify as different from this issue.  Her sister has psoriasis.  She is on methotrexate, hydroxychloroquine.. She is a nonsmoker, alcohol rarely, no regular exercise, she gets she does not get a refreshing sleep at night. She has had fracture of her ankle or right sided, and 2004. She had bunion repair. She is status post hysteroscopy and ablation. She is unable to take the penicillin because of anaphylactic shock. Further historical information, including ROS and limitation in  activities as noted in the multidimensional health assessment questionnaire scanned in the EMR and in the assessment and plan section.. ALLERGIES:Penicillins    PAST MEDICAL/ACTIVE PROBLEMS/MEDICATION/SOCIAL DATA  Past Medical History:   Diagnosis Date     Asthma     aggravated with colds     History of anesthesia complications      PONV (postoperative nausea and vomiting)      Rheumatoid arthritis      History   Smoking Status     Never Smoker   Smokeless Tobacco     Not on file     Patient Active Problem List   Diagnosis     Gallstone     Inflammatory polyarthritis     High risk medication use     S/P vaginal hysterectomy     Trochanteric bursitis of both hips     Acute bilateral low back pain with sciatica     Family history of psoriasis in sister     Piriformis syndrome, unspecified laterality     Current Outpatient Prescriptions   Medication Sig Dispense Refill     acetaminophen (TYLENOL) 500 MG tablet Take 1,000 mg by mouth every 6 (six) hours as needed for pain.       cetirizine (ZYRTEC) 10 MG tablet Take 10 mg by mouth daily.       folic acid (FOLVITE) 1 MG tablet TAKE 1 TABLET DAILY 90 tablet 3     hydroxychloroquine (PLAQUENIL) 200 mg tablet TAKE 1 TABLET TWICE A  tablet 1     methotrexate 2.5 MG tablet TAKE 10 TABLETS ONCE A WEEK 120 tablet 0     No current facility-administered medications for  "this visit.        DETAILED EXAMINATION (six area) :  Vitals:    01/05/18 1609   BP: 124/80   Pulse: 80   Weight: 205 lb (93 kg)   Height: 5' 7.5\" (1.715 m)     Alert oriented. Head including the face is examined for malar rash, heliotropes, scarring, lupus pernio. Eyes examined for redness such as in episcleritis/scleritis, periorbital lesions.   Neck examined  for lymph nodes, range of motion Both upper and lower extremities (all four) examined for swollen, warm &/or  tender joints, range of motion, rash, muscle weakness, edema. The salient normal / abnormal findings are appended.  She is once again quite tender in the piriformis region bilaterally, with positive piriformis stretch on both sides.  Milder tenderness in the trochanteric region no tenderness in the ischial area.  Range of motion of the hip is intact.  Straight leg raising is normal.  No synovitis in any of the palpable appendicular joints.         LAB / IMAGING DATA:  ALT   Date Value Ref Range Status   12/29/2017 27 0 - 45 U/L Final   12/04/2017 38 0 - 45 U/L Final   09/20/2017 20 0 - 45 U/L Final     Albumin   Date Value Ref Range Status   12/29/2017 3.9 3.5 - 5.0 g/dL Final   12/04/2017 4.0 3.5 - 5.0 g/dL Final   09/20/2017 3.9 3.5 - 5.0 g/dL Final     Creatinine   Date Value Ref Range Status   12/29/2017 0.72 0.60 - 1.10 mg/dL Final   12/04/2017 0.71 0.60 - 1.10 mg/dL Final   09/20/2017 0.74 0.60 - 1.10 mg/dL Final       WBC   Date Value Ref Range Status   12/29/2017 5.6 4.0 - 11.0 thou/uL Final   09/20/2017 4.7 4.0 - 11.0 thou/uL Final   09/10/2015 9.1 4.0 - 11.0 thou/uL Final     Hemoglobin   Date Value Ref Range Status   12/29/2017 13.0 12.0 - 16.0 g/dL Final   09/20/2017 12.8 12.0 - 16.0 g/dL Final   08/28/2017 13.0 12.0 - 16.0 g/dL Final     Platelets   Date Value Ref Range Status   12/29/2017 277 140 - 440 thou/uL Final   09/20/2017 275 140 - 440 thou/uL Final   08/28/2017 251 140 - 440 thou/uL Final       Lab Results   Component Value Date "    RF <15.0 08/30/2017    SEDRATE 5 08/30/2017

## 2021-06-15 NOTE — PROGRESS NOTES
Essence Rubio is a 50 y.o. female who is being evaluated via a billable video visit.      How would you like to obtain your AVS? MyChart.  If dropped from the video visit, the video invitation should be resent by: Text to cell phone: [phone]  Will anyone else be joining your video visit? No      Video Start Time: 3:39 PM  Video-Visit Details    Type of service:  Video Visit    Video End Time (time video stopped): 3:50 PM  Originating Location (pt. Location): Home    Distant Location (provider location):  [facility]     Platform used for Video Visit: [employer]      This document was created using a software with less than 100% fidelity, at times resulting in unintended, even erroneous syntax and grammar.  The reader is advised to keep this under consideration while reviewing, interpreting this note.           ASSESSMENT AND PLAN:    Diagnoses and all orders for this visit:    Psoriatic arthritis (H)  -     predniSONE (DELTASONE) 10 mg tablet; Take 10 mg by mouth daily for 21 days.  Dispense: 30 tablet; Refill: 0  -     methotrexate 2.5 MG tablet; TAKE 10 TABLETS ONCE A WEEK  Dispense: 120 tablet; Refill: 0    Enthesitis -knee  -     predniSONE (DELTASONE) 10 mg tablet; Take 10 mg by mouth daily for 21 days.  Dispense: 30 tablet; Refill: 0  -     methotrexate 2.5 MG tablet; TAKE 10 TABLETS ONCE A WEEK  Dispense: 120 tablet; Refill: 0    Family history of psoriasis in sister    High risk medication use    Inflammatory polyarthritis (H)  -     methotrexate 2.5 MG tablet; TAKE 10 TABLETS ONCE A WEEK  Dispense: 120 tablet; Refill: 0        Follow up in 6 months      HISTORY OF PRESENTING ILLNESS:  Essence Rubio 50 y.o. is evaluated here via video/audio link. This is for follow-up.  She has psoriatic arthritis, sisters history of psoriasis her own of enthesitis and inflammatory joint disease, on methotrexate, Otezla, folic acid, recent for weeks she has been hurting more, this is around  the knees, not radiating down her calves, first thing in the morning she is stiff for about an hour or a half, during the day as she sits for length of time and then begins to walk the same thing happens, has not observed swelling, no other joint areas of troubled, she did have a fall but that was after the symptoms began.  As a  she has been given the first shot of COVID-19 vaccination second 1 is due shortly.  Her recent labs are reviewed within acceptable range.    We discussed various options.  The following plan is obtained.  She will go for prednisone at a modest dose for the next 20 days, and then stop she how she does with that, if there is no recurrence or none for a long time as the current management plan will continue.  Otherwise we will meet in person further course to be charted accordingly.          ROS enquiry held for fever, ocular symptoms, rash, headache,  GI issues.  Today we also discussed the issues related to the current pandemic, the pros and cons of the current treatment plan, the CDC guidelines such as social distancing washing the hands covering the cough.  ALLERGIES:Penicillins and Penicillin v potassium    PAST MEDICAL/ACTIVE PROBLEMS/MEDICATION/SOCIAL DATA  Past Medical History:   Diagnosis Date     Asthma     aggravated with colds     History of anesthesia complications      PONV (postoperative nausea and vomiting)      Rheumatoid arthritis (H)      Social History     Tobacco Use   Smoking Status Never Smoker   Smokeless Tobacco Never Used     Patient Active Problem List   Diagnosis     Gallstone     High risk medication use     S/P vaginal hysterectomy     Trochanteric bursitis of both hips     Acute bilateral low back pain with sciatica     Family history of psoriasis in sister     Piriformis syndrome, unspecified laterality     Enthesitis -knee     Psoriatic arthritis (H)     1st MTP arthritis     Current Outpatient Medications   Medication Sig Dispense Refill      acetaminophen (TYLENOL) 500 MG tablet Take 1,000 mg by mouth every 6 (six) hours as needed for pain.       cetirizine (ZYRTEC) 10 MG tablet Take 10 mg by mouth daily.       ergocalciferol (VITAMIN D2) 50,000 unit capsule Take 50,000 Units by mouth every 7 days.              estradiol (ESTRACE) 2 MG tablet Take 2 mg by mouth daily.              folic acid (FOLVITE) 1 MG tablet TAKE 1 TABLET DAILY 90 tablet 3     methotrexate 2.5 MG tablet TAKE 10 TABLETS ONCE A WEEK 120 tablet 0     OTEZLA 30 mg Tab TAKE 1 TABLET TWICE A  tablet 0     No current facility-administered medications for this visit.          EXAMINATION:    Using the audio and video link as best as possible the constitutional, neck, neurologic, psych, skin, both upper extremities areas/organ system were evaluated during this assessment.  Some of the important findings: Alert, oriented, speech fluent.    Able to fully flex the digits, into fists bilaterally, wrist and elbow range of motion appear normal, abduction of the shoulder is normal.      LAB / IMAGING DATA:  ALT   Date Value Ref Range Status   01/18/2021 20 0 - 45 U/L Final   10/16/2020 21 0 - 45 U/L Final   07/22/2020 24 0 - 45 U/L Final     Albumin   Date Value Ref Range Status   01/18/2021 3.8 3.5 - 5.0 g/dL Final   10/16/2020 3.6 3.5 - 5.0 g/dL Final   07/22/2020 3.8 3.5 - 5.0 g/dL Final     Creatinine   Date Value Ref Range Status   01/18/2021 0.62 0.60 - 1.10 mg/dL Final   10/16/2020 0.68 0.60 - 1.10 mg/dL Final   07/22/2020 0.67 0.60 - 1.10 mg/dL Final       WBC   Date Value Ref Range Status   01/18/2021 5.1 4.0 - 11.0 thou/uL Final   10/16/2020 4.6 4.0 - 11.0 thou/uL Final   09/10/2015 9.1 4.0 - 11.0 thou/uL Final     Hemoglobin   Date Value Ref Range Status   01/18/2021 13.4 12.0 - 16.0 g/dL Final   10/16/2020 12.7 12.0 - 16.0 g/dL Final   07/22/2020 13.4 12.0 - 16.0 g/dL Final     Platelets   Date Value Ref Range Status   01/18/2021 323 140 - 440 thou/uL Final   10/16/2020 296  140 - 440 thou/uL Final   07/22/2020 337 140 - 440 thou/uL Final       Lab Results   Component Value Date    RF <15.0 08/30/2017    SEDRATE 5 08/30/2017

## 2021-06-16 PROBLEM — G57.00 PIRIFORMIS SYNDROME, UNSPECIFIED LATERALITY: Status: ACTIVE | Noted: 2017-10-17

## 2021-06-16 PROBLEM — L40.50 PSORIATIC ARTHRITIS (H): Status: ACTIVE | Noted: 2018-05-22

## 2021-06-16 PROBLEM — M54.40 ACUTE BILATERAL LOW BACK PAIN WITH SCIATICA: Status: ACTIVE | Noted: 2017-07-05

## 2021-06-16 PROBLEM — M19.079 1ST MTP ARTHRITIS: Status: ACTIVE | Noted: 2019-05-20

## 2021-06-16 PROBLEM — M77.9 ENTHESITIS: Status: ACTIVE | Noted: 2018-05-22

## 2021-06-16 PROBLEM — Z84.0: Status: ACTIVE | Noted: 2017-08-30

## 2021-06-16 NOTE — PROGRESS NOTES
ASSESSMENT AND PLAN:  Essence Rubio 47 y.o. female is here for follow-up.  She has inflammatory polyarthritis, a sister with psoriasis.  She has accumulated some features here which raises the likelihood that she may in fact have psoriatic arthritis.  This includes persistent trochanteric bursitis, knee pain likely secondary to enthesopathy and without evidence of intra-articular findings clinically or on ultrasound examination.  Today we had a long discussion.  1 of the options is to start her on Biologics.  Otezla would be an option to.  At this point of the various options of following plan is obtained.  She will start off of prednisone 7.5 mg daily for the next 3-4 weeks.  Major side effects including ocular metabolic bone related were reviewed.  Depending upon how well she responds to this for the course to be charted accordingly.  Depending on her response she may be candidate for sulfasalazine or Biologics.       Diagnoses and all orders for this visit:    Inflammatory polyarthritis  -     predniSONE (DELTASONE) 2.5 MG tablet; Take 7.5 mg/d prednisone PO for 1 month, reduce to 5 mg/d for the next month, and then reduce to 2.5 mg/d for the third month and then stop.  Dispense: 90 tablet; Refill: 2    Trochanteric bursitis of both hips  -     predniSONE (DELTASONE) 2.5 MG tablet; Take 7.5 mg/d prednisone PO for 1 month, reduce to 5 mg/d for the next month, and then reduce to 2.5 mg/d for the third month and then stop.  Dispense: 90 tablet; Refill: 2    Family history of psoriasis in sister  -     predniSONE (DELTASONE) 2.5 MG tablet; Take 7.5 mg/d prednisone PO for 1 month, reduce to 5 mg/d for the next month, and then reduce to 2.5 mg/d for the third month and then stop.  Dispense: 90 tablet; Refill: 2        HISTORY OF PRESENTING ILLNESS:  Essence Rubio 47 y.o. , a second , is here for follow-up of inflammatory polyarthritis, trochanteric area discomfort recent MRI suggests  tendinopathy.  She continues to hurt.  Now she is hurting more in her knees.  She has not seen swelling.  Sometimes the pain shoots down to the shins.  Then her mid feet area hurts.  She does not have heel pain.  Her pain in the hands and the feet has improved significantly with the current regimen as noted.  She has recently had physical.  Her family physician suggested that she considers taking vitamin D and started hormone replacement low-dose aspirin she feels so much better not that the pain is completely disappeared however she is able to sleep through the night.  She noted the pain to be mild.  Overall 3.5/10.  Able to do most of her day-to-day activity without any or with some difficulty.  Her morning stiffness is limited to 30 minutes.  There is no fever weight loss blurry vision eye redness mouth also nausea or rash.  Until her previous visit she had noted pain in bilateral buttock area.  She rates this pain as 7.5/10.  Moderately severe.  This is waking her up from sleep.  Repeatedly 2 or 3 times each night.  She was seen here for this in October physical therapy was initiated that has not helped.  There is no radiation down the legs.  She can be woken up from sleep irrespective of which side she is laying down or on her back.  During the day this also troublesome but not as much.  There is no numbness or tingling no trauma.  She has had trochanteric bursitis for which she was injected and had 3 weeks response.  That pain she can identify as different from this issue.  Her sister has psoriasis.  She is on methotrexate, hydroxychloroquine.. She is a nonsmoker, alcohol rarely, no regular exercise, she gets she does not get a refreshing sleep at night. She has had fracture of her ankle or right sided, and 2004. She had bunion repair. She is status post hysteroscopy and ablation. She is unable to take the penicillin because of anaphylactic shock. Further historical information, including ROS and limitation in  " activities as noted in the multidimensional health assessment questionnaire scanned in the EMR and in the assessment and plan section.. ALLERGIES:Penicillins    PAST MEDICAL/ACTIVE PROBLEMS/MEDICATION/SOCIAL DATA  Past Medical History:   Diagnosis Date     Asthma     aggravated with colds     History of anesthesia complications      PONV (postoperative nausea and vomiting)      Rheumatoid arthritis      History   Smoking Status     Never Smoker   Smokeless Tobacco     Never Used     Patient Active Problem List   Diagnosis     Gallstone     Inflammatory polyarthritis     High risk medication use     S/P vaginal hysterectomy     Trochanteric bursitis of both hips     Acute bilateral low back pain with sciatica     Family history of psoriasis in sister     Piriformis syndrome, unspecified laterality     Current Outpatient Prescriptions   Medication Sig Dispense Refill     acetaminophen (TYLENOL) 500 MG tablet Take 1,000 mg by mouth every 6 (six) hours as needed for pain.       cetirizine (ZYRTEC) 10 MG tablet Take 10 mg by mouth daily.       ergocalciferol (VITAMIN D2) 50,000 unit capsule Take 50,000 Units by mouth every 7 days.       estradiol valerate (DELESTROGEN) 20 mg/mL injection Inject 20 mg into the shoulder, thigh, or buttocks every 28 days.       folic acid (FOLVITE) 1 MG tablet TAKE 1 TABLET DAILY 90 tablet 3     hydroxychloroquine (PLAQUENIL) 200 mg tablet TAKE 1 TABLET TWICE A  tablet 1     methotrexate 2.5 MG tablet TAKE 10 TABLETS ONCE A WEEK 120 tablet 0     No current facility-administered medications for this visit.      DETAILED EXAMINATION  03/19/18  :  Vitals:    03/19/18 1631   BP: 122/82   Pulse: 60   Weight: 205 lb (93 kg)   Height: 5' 7.5\" (1.715 m)     Alert oriented. Head including the face is examined for malar rash, heliotropes, scarring, lupus pernio. Eyes examined for redness such as in episcleritis/scleritis, periorbital lesions.   Neck/ Face examined for parotid gland swelling, " range of motion of neck.  Left upper and lower and right upper and lower extremities examined for tenderness, swelling, warmth of the appendicular joints, range of motion, edema, rash.  Some of the important findings included: Is no synovitis of any of the palpable appendicular joints.  She is tender in the trochanteric areas.  He is tender at the quadriceps tendon insertion of the patella worse on the left side without evidence of effusion within the knee or warmth.  She does not have dactylitis.       LAB / IMAGING DATA:  ALT   Date Value Ref Range Status   02/19/2018 28 0 - 45 U/L Final   12/29/2017 27 0 - 45 U/L Final   12/04/2017 38 0 - 45 U/L Final     Albumin   Date Value Ref Range Status   02/19/2018 3.4 (L) 3.5 - 5.0 g/dL Final   12/29/2017 3.9 3.5 - 5.0 g/dL Final   12/04/2017 4.0 3.5 - 5.0 g/dL Final     Creatinine   Date Value Ref Range Status   02/19/2018 0.63 0.60 - 1.10 mg/dL Final   12/29/2017 0.72 0.60 - 1.10 mg/dL Final   12/04/2017 0.71 0.60 - 1.10 mg/dL Final       WBC   Date Value Ref Range Status   02/19/2018 7.5 4.0 - 11.0 thou/uL Final   12/29/2017 5.6 4.0 - 11.0 thou/uL Final   09/10/2015 9.1 4.0 - 11.0 thou/uL Final     Hemoglobin   Date Value Ref Range Status   02/19/2018 12.4 12.0 - 16.0 g/dL Final   12/29/2017 13.0 12.0 - 16.0 g/dL Final   09/20/2017 12.8 12.0 - 16.0 g/dL Final     Platelets   Date Value Ref Range Status   02/19/2018 266 140 - 440 thou/uL Final   12/29/2017 277 140 - 440 thou/uL Final   09/20/2017 275 140 - 440 thou/uL Final       Lab Results   Component Value Date    RF <15.0 08/30/2017    SEDRATE 5 08/30/2017

## 2021-06-16 NOTE — TELEPHONE ENCOUNTER
Telephone Encounter by Veronica Roach at 7/3/2019 11:04 AM     Author: Veronica Roach Service: -- Author Type: Financial Resource Guide    Filed: 7/3/2019 11:12 AM Encounter Date: 5/21/2019 Status: Addendum    : Veronica Roach (Financial Resource Guide)    Related Notes: Original Note by Veronica Roach (Financial Resource Guide) filed at 7/3/2019 11:08 AM       Medication: Otezla 30mg  Qty: 60 tabs for 30 days  Effective Dates: 06/18/2019 - 07/03/2022  Pharmacy: Restricted to [facility]  Copay Amount: Unknown but with the copay card should be $0.00. Provided copay card billing info on the prescription   Copay Assistance: yes enrolled patient after obtaining verbal consent  Patient Notified? Yes, I spoke with the patient and gave her [facility]'s phone number

## 2021-06-16 NOTE — TELEPHONE ENCOUNTER
Telephone Encounter by Veronica Roach at 3/9/2020  1:48 PM     Author: Veronica Roach Service: -- Author Type: Financial Resource Guide    Filed: 3/9/2020  1:56 PM Encounter Date: 3/9/2020 Status: Signed    : Veronica Roach (Financial Resource Guide)       Medication: Otezla 30mg  Qty: 60 tabs for 30 days  Insurance: [employer]  Test Claim: not covered at this location  Copay Assistance: on file at pharmacy  Pharmacy: restricted to [facility]  Additional Information: PA exp 7/3/2022

## 2021-06-16 NOTE — TELEPHONE ENCOUNTER
Telephone Encounter by Veronica Roach at 5/21/2019  9:25 AM     Author: Veronica Roach Service: -- Author Type: Financial Resource Guide    Filed: 5/21/2019  9:46 AM Encounter Date: 5/21/2019 Status: Addendum    : Veronica Roach (Financial Resource Guide)    Related Notes: Original Note by Veronica Roach (Financial Resource Guide) filed at 5/21/2019  9:31 AM       Medication: Otezla  QTY: 60 tabs for 30 days  Insurance: [employer]  Additional Information: practive PA for Free drug program that expires in August

## 2021-06-16 NOTE — TELEPHONE ENCOUNTER
Telephone Encounter by Veronica Roach at 9/9/2019  8:56 AM     Author: Veronica Roach Service: -- Author Type: Financial Resource Guide    Filed: 9/9/2019  8:58 AM Encounter Date: 9/9/2019 Status: Signed    : Veronica Roach (Financial Resource Guide)       Medication: Otezla 30mg  Qty: 60 tabs for 30 days  Insurance: Paid/[employer]  Test Claim: Refill too soon until 9/16/19  Pharmacy: Restricted to [facility]  Additional Information: has $0 copay card on file. Will rerun test claim on 9/16 to make sure it comes back a clean claim

## 2021-06-16 NOTE — TELEPHONE ENCOUNTER
Telephone Encounter by Veronica Roach at 1/6/2020  2:45 PM     Author: Veronica Roach Service: -- Author Type: Financial Resource Guide    Filed: 1/6/2020  2:48 PM Encounter Date: 1/6/2020 Status: Signed    : Veronica Roach (Financial Resource Guide)       Medication: Otezla 30mg  Qty: 60 tabs for 30 days  Insurance: [employer]  Test Claim: refill too soon until 01/11/20  Pharmacy: restricted to [facility]  Additional Information: will rerun test claim on 1/13/20

## 2021-06-16 NOTE — TELEPHONE ENCOUNTER
Telephone Encounter by Veronica Roach at 9/16/2019  8:46 AM     Author: Veronica Roach Service: -- Author Type: Financial Resource Guide    Filed: 9/16/2019  8:47 AM Encounter Date: 9/9/2019 Status: Signed    : Veronica Roach (Financial Resource Guide)       Attempted to rerun test claim but it has already been filled

## 2021-06-16 NOTE — TELEPHONE ENCOUNTER
Telephone Encounter by Veronica Roach at 5/23/2019  7:20 AM     Author: Veronica Roach Service: -- Author Type: Financial Resource Guide    Filed: 5/23/2019  7:27 AM Encounter Date: 5/21/2019 Status: Signed    : Veronica oRach (Financial Resource Guide)       Denial Reason: Has not tried and failed one of the following: Cosentyx, Enbrel, Humira or Stelara  Appealing? No, will renew with the free drug program  Patient Notified? No need to  Additional Information: Will renew free drug enrollment in June, expires in August.

## 2021-06-16 NOTE — PROGRESS NOTES
ASSESSMENT AND PLAN:  Essence Rubio 47 y.o. female is here for follow-up.  She has inflammatory polyarthritis, she has had persistent trochanteric area pain which did not respond to local corticosteroid injection, physical therapy was of limited use, she went on to have MRI that showed tendinopathy of the trochanteric insertion.  Her symptoms have improved so much now that she is able to sleep better at night.  At this is since she was asked to take vitamin D and doing a general physical.  One may recall that her sister is noted to have psoriasis.  We talked about the possibility that some of the findings of the overall milieu may suggest enthesitis/psoriatic arthropathy.  As much as improvement she has observed we will continue to take methotrexate hydroxychloroquine path going forward.  There is been no evidence of autoimmunity.  She is due for labs to be done today.    Diagnoses and all orders for this visit:    Inflammatory polyarthritis  -     Creatinine  -     HM2(CBC w/o Differential)  -     ALT (SGPT)  -     Albumin    High risk medication use  -     Creatinine  -     HM2(CBC w/o Differential)  -     ALT (SGPT)  -     Albumin    Family history of psoriasis in sister          HISTORY OF PRESENTING ILLNESS:  Essence Rubio 47 y.o. , a second , is here for follow-up of inflammatory polyarthritis, trochanteric area discomfort recent MRI suggests tendinopathy.  She has recently had physical.  Her family physician suggested that she considers taking vitamin D and started hormone replacement low-dose aspirin she feels so much better not that the pain is completely disappeared however she is able to sleep through the night.  She noted the pain to be mild.  Overall 3.5/10.  Able to do most of her day-to-day activity without any or with some difficulty.  Her morning stiffness is limited to 30 minutes.  There is no fever weight loss blurry vision eye redness mouth also nausea or rash.  Until  her previous visit she had noted pain in bilateral buttock area.  She rates this pain as 7.5/10.  Moderately severe.  This is waking her up from sleep.  Repeatedly 2 or 3 times each night.  She was seen here for this in October physical therapy was initiated that has not helped.  There is no radiation down the legs.  She can be woken up from sleep irrespective of which side she is laying down or on her back.  During the day this also troublesome but not as much.  There is no numbness or tingling no trauma.  She has had trochanteric bursitis for which she was injected and had 3 weeks response.  That pain she can identify as different from this issue.  Her sister has psoriasis.  She is on methotrexate, hydroxychloroquine.. She is a nonsmoker, alcohol rarely, no regular exercise, she gets she does not get a refreshing sleep at night. She has had fracture of her ankle or right sided, and 2004. She had bunion repair. She is status post hysteroscopy and ablation. She is unable to take the penicillin because of anaphylactic shock. Further historical information, including ROS and limitation in  activities as noted in the multidimensional health assessment questionnaire scanned in the EMR and in the assessment and plan section.. ALLERGIES:Penicillins    PAST MEDICAL/ACTIVE PROBLEMS/MEDICATION/SOCIAL DATA  Past Medical History:   Diagnosis Date     Asthma     aggravated with colds     History of anesthesia complications      PONV (postoperative nausea and vomiting)      Rheumatoid arthritis      History   Smoking Status     Never Smoker   Smokeless Tobacco     Never Used     Patient Active Problem List   Diagnosis     Gallstone     Inflammatory polyarthritis     High risk medication use     S/P vaginal hysterectomy     Trochanteric bursitis of both hips     Acute bilateral low back pain with sciatica     Family history of psoriasis in sister     Piriformis syndrome, unspecified laterality     Current Outpatient Prescriptions  "  Medication Sig Dispense Refill     acetaminophen (TYLENOL) 500 MG tablet Take 1,000 mg by mouth every 6 (six) hours as needed for pain.       cetirizine (ZYRTEC) 10 MG tablet Take 10 mg by mouth daily.       folic acid (FOLVITE) 1 MG tablet TAKE 1 TABLET DAILY 90 tablet 3     hydroxychloroquine (PLAQUENIL) 200 mg tablet TAKE 1 TABLET TWICE A  tablet 1     methotrexate 2.5 MG tablet TAKE 10 TABLETS ONCE A WEEK 120 tablet 0     No current facility-administered medications for this visit.      DETAILED EXAMINATION  02/19/18  :  Vitals:    02/19/18 1612   BP: 126/86   Weight: 205 lb (93 kg)   Height: 5' 7.5\" (1.715 m)     Alert oriented. Head including the face is examined for malar rash, heliotropes, scarring, lupus pernio. Eyes examined for redness such as in episcleritis/scleritis, periorbital lesions.   Neck/ Face examined for parotid gland swelling, range of motion of neck.  Left upper and lower and right upper and lower extremities examined for tenderness, swelling, warmth of the appendicular joints, range of motion, edema, rash.  Some of the important findings included: She does not have digital dactylitis, no nail changes onycholysis and pitting.  She does not have synovitis of the palpable appendicular joints.         LAB / IMAGING DATA:  ALT   Date Value Ref Range Status   12/29/2017 27 0 - 45 U/L Final   12/04/2017 38 0 - 45 U/L Final   09/20/2017 20 0 - 45 U/L Final     Albumin   Date Value Ref Range Status   12/29/2017 3.9 3.5 - 5.0 g/dL Final   12/04/2017 4.0 3.5 - 5.0 g/dL Final   09/20/2017 3.9 3.5 - 5.0 g/dL Final     Creatinine   Date Value Ref Range Status   12/29/2017 0.72 0.60 - 1.10 mg/dL Final   12/04/2017 0.71 0.60 - 1.10 mg/dL Final   09/20/2017 0.74 0.60 - 1.10 mg/dL Final       WBC   Date Value Ref Range Status   12/29/2017 5.6 4.0 - 11.0 thou/uL Final   09/20/2017 4.7 4.0 - 11.0 thou/uL Final   09/10/2015 9.1 4.0 - 11.0 thou/uL Final     Hemoglobin   Date Value Ref Range Status "   12/29/2017 13.0 12.0 - 16.0 g/dL Final   09/20/2017 12.8 12.0 - 16.0 g/dL Final   08/28/2017 13.0 12.0 - 16.0 g/dL Final     Platelets   Date Value Ref Range Status   12/29/2017 277 140 - 440 thou/uL Final   09/20/2017 275 140 - 440 thou/uL Final   08/28/2017 251 140 - 440 thou/uL Final       Lab Results   Component Value Date    RF <15.0 08/30/2017    SEDRATE 5 08/30/2017

## 2021-06-16 NOTE — TELEPHONE ENCOUNTER
Telephone Encounter by Veronica Roach at 1/13/2020 12:59 PM     Author: Veronica Roach Service: -- Author Type: Financial Resource Guide    Filed: 1/13/2020  1:01 PM Encounter Date: 1/6/2020 Status: Signed    : Veronica Roach (Financial Resource Guide)       [employer] test claim

## 2021-06-17 NOTE — TELEPHONE ENCOUNTER
Telephone Encounter by Veronica Roach at 11/30/2020  1:03 PM     Author: Veronica Roach Service: -- Author Type: Financial Resource Guide    Filed: 11/30/2020  1:05 PM Encounter Date: 11/30/2020 Status: Signed    : Veronica Roach (Financial Resource Guide)       Medication: Otezla 30mg  QTY: 60 tabs for 30 days  Insurance: [employer]  Expected Copay: $0  Copay Card Needed? enrolled  Farooq Needed/Available? Not needed  Pharmacy: restricted to [facility]  Additional Information: NA

## 2021-06-17 NOTE — TELEPHONE ENCOUNTER
Telephone Encounter by Veronica Roach at 5/26/2020  9:07 AM     Author: Veronica Roach Service: -- Author Type: Financial Resource Guide    Filed: 5/26/2020  9:08 AM Encounter Date: 5/26/2020 Status: Signed    : Veronica Roach (Financial Resource Guide)       Medication: Otezla 30mg  Qty: 60 tabs for 30 days  Insurance: [employer]  Test Claim: refill too soon until 05/31/2020  Pharmacy: restricted to [facility]  Additional Information: PA exp 07/03/2022

## 2021-06-18 NOTE — PROGRESS NOTES
ASSESSMENT AND PLAN:  Essence Rubio 47 y.o. female is here for follow-up.  She has psoriatic arthritis characterized by inflammatory arthropathy, enthesitis both clinically as well as MRI findings.  Previously characterized in her case as inflammatory polyarthritis as she documented more evidence that has psoriasis and her sister, clear enthesopathy, responsive to low-dose prednisone.  She has been on methotrexate for a long time at 25 mg per week.  She is also been on hydroxychloroquine.  The way she felt with 7.5 mg of prednisone including around her entheses in the patella she could not remember being as good as that during the past 2 years.  I have asked her to consider taking Otezla.  Major side effects are outlined.  Literature on this is provided.  There is no history of depression.  Will meet here in 3 months.  In the interim she could take prednisone 5 mg on as-needed basis to control some of her symptoms.  I have asked her to avoid taking that in the last 4 weeks before her visit here.       Diagnoses and all orders for this visit:    Psoriatic arthritis  -     apremilast 30 mg Tab; Take 30 mg by mouth 2 (two) times a day.  Dispense: 60 tablet; Refill: 3  -     predniSONE (DELTASONE) 5 MG tablet; Take 7.5 mg/d prednisone PO for 1 month, reduce to 5 mg/d for the next month, and then reduce to 2.5 mg/d for the third month and then stop.  Dispense: 30 tablet; Refill: 1    Family history of psoriasis in sister    Enthesitis -knee  -     apremilast 30 mg Tab; Take 30 mg by mouth 2 (two) times a day.  Dispense: 60 tablet; Refill: 3  -     predniSONE (DELTASONE) 5 MG tablet; Take 7.5 mg/d prednisone PO for 1 month, reduce to 5 mg/d for the next month, and then reduce to 2.5 mg/d for the third month and then stop.  Dispense: 30 tablet; Refill: 1    Trochanteric bursitis of both hips    High risk medication use          HISTORY OF PRESENTING ILLNESS:  Essence Rubio 47 y.o. , a second , is  here for follow-up of inflammatory polyarthritis, trochanteric area discomfort recent MRI suggests tendinopathy, family history of psoriasis.  The modest dose of prednisone she seems to have finally turned the corner.  She noted that with 7.5 mg and 5 mg of prednisone daily she felt so much better.  She does not recall how she well she felt going as far back as 2 years.  She has noted pain level of 0.5.  She is able do most of her day-to-day activities without difficulty.  The morning stiffness is 30 minutes.  There is no fever weight loss blurry vision eye redness mouth was a nausea cough there is no rash.  As he brought down the prednisone to 2.5 mg daily some of the symptoms began to come back again but not to the same degree.   Her sister has psoriasis.  She is on methotrexate, hydroxychloroquine.. She is a nonsmoker, alcohol rarely, no regular exercise, she gets she does not get a refreshing sleep at night. She has had fracture of her ankle or right sided, and 2004. She had bunion repair. She is status post hysteroscopy and ablation. She is unable to take the penicillin because of anaphylactic shock. Further historical information, including ROS and limitation in  activities as noted in the multidimensional health assessment questionnaire scanned in the EMR and in the assessment and plan section.. ALLERGIES:Penicillins    PAST MEDICAL/ACTIVE PROBLEMS/MEDICATION/SOCIAL DATA  Past Medical History:   Diagnosis Date     Asthma     aggravated with colds     History of anesthesia complications      PONV (postoperative nausea and vomiting)      Rheumatoid arthritis      History   Smoking Status     Never Smoker   Smokeless Tobacco     Never Used     Patient Active Problem List   Diagnosis     Gallstone     Inflammatory polyarthritis     High risk medication use     S/P vaginal hysterectomy     Trochanteric bursitis of both hips     Acute bilateral low back pain with sciatica     Family history of psoriasis in sister      Piriformis syndrome, unspecified laterality     Current Outpatient Prescriptions   Medication Sig Dispense Refill     cetirizine (ZYRTEC) 10 MG tablet Take 10 mg by mouth daily.       ergocalciferol (VITAMIN D2) 50,000 unit capsule Take 50,000 Units by mouth every 7 days.       estradiol valerate (DELESTROGEN) 20 mg/mL injection Inject 20 mg into the shoulder, thigh, or buttocks every 28 days.       folic acid (FOLVITE) 1 MG tablet TAKE 1 TABLET DAILY 90 tablet 3     hydroxychloroquine (PLAQUENIL) 200 mg tablet TAKE 1 TABLET TWICE A  tablet 1     methotrexate 2.5 MG tablet TAKE 10 TABLETS ONCE A WEEK 120 tablet 0     predniSONE (DELTASONE) 2.5 MG tablet Take 7.5 mg/d prednisone PO for 1 month, reduce to 5 mg/d for the next month, and then reduce to 2.5 mg/d for the third month and then stop. 90 tablet 2     acetaminophen (TYLENOL) 500 MG tablet Take 1,000 mg by mouth every 6 (six) hours as needed for pain.       No current facility-administered medications for this visit.      DETAILED EXAMINATION  05/22/18  :  Vitals:    05/22/18 1606   BP: 124/68   Patient Site: Right Arm   Patient Position: Sitting   Cuff Size: Adult Regular   Pulse: 66   Weight: 205 lb (93 kg)     Alert oriented. Head including the face is examined for malar rash, heliotropes, scarring, lupus pernio. Eyes examined for redness such as in episcleritis/scleritis, periorbital lesions.   Neck/ Face examined for parotid gland swelling, range of motion of neck.  Left upper and lower and right upper and lower extremities examined for tenderness, swelling, warmth of the appendicular joints, range of motion, edema, rash.  Some of the important findings included: There is no synovitis in any of the palpable appendical joints of the upper extremities.  She no longer has the tenderness in the entheses such as the trochanteric and the patellar areas.         LAB / IMAGING DATA:  ALT   Date Value Ref Range Status   05/17/2018 19 0 - 45 U/L Final    02/19/2018 28 0 - 45 U/L Final   12/29/2017 27 0 - 45 U/L Final     Albumin   Date Value Ref Range Status   05/17/2018 3.5 3.5 - 5.0 g/dL Final   02/19/2018 3.4 (L) 3.5 - 5.0 g/dL Final   12/29/2017 3.9 3.5 - 5.0 g/dL Final     Creatinine   Date Value Ref Range Status   05/17/2018 0.70 0.60 - 1.10 mg/dL Final   02/19/2018 0.63 0.60 - 1.10 mg/dL Final   12/29/2017 0.72 0.60 - 1.10 mg/dL Final       WBC   Date Value Ref Range Status   05/17/2018 9.1 4.0 - 11.0 thou/uL Final   02/19/2018 7.5 4.0 - 11.0 thou/uL Final   09/10/2015 9.1 4.0 - 11.0 thou/uL Final     Hemoglobin   Date Value Ref Range Status   05/17/2018 12.0 12.0 - 16.0 g/dL Final   02/19/2018 12.4 12.0 - 16.0 g/dL Final   12/29/2017 13.0 12.0 - 16.0 g/dL Final     Platelets   Date Value Ref Range Status   05/17/2018 308 140 - 440 thou/uL Final   02/19/2018 266 140 - 440 thou/uL Final   12/29/2017 277 140 - 440 thou/uL Final       Lab Results   Component Value Date    RF <15.0 08/30/2017    SEDRATE 5 08/30/2017

## 2021-06-20 NOTE — PROGRESS NOTES
ASSESSMENT AND PLAN:  Essence Rubio 48 y.o. female is here for follow-up.  She has psoriatic arthritis characterized by inflammatory arthropathy, enthesitis both clinically as well as MRI findings.  Sister's history of psoriasis.  She has done so much better now with the Otezla.  Her enthesitis is resolved.  I will ask her to continue maria teresa, methotrexate but can discontinue hydroxychloroquine.  She is no longer on prednisone.  Over time we might be able to attempt reduction in dose of methotrexate and even potentially elimination.  And this would be a slow process if at all.  Recent labs are normal.  We will meet again in 3 months with labs just prior.           Diagnoses and all orders for this visit:    Psoriatic arthritis (H)  -     apremilast 30 mg Tab; Take 30 mg by mouth 2 (two) times a day.  Dispense: 60 tablet; Refill: 3    High risk medication use    Enthesitis -knee  -     apremilast 30 mg Tab; Take 30 mg by mouth 2 (two) times a day.  Dispense: 60 tablet; Refill: 3    Family history of psoriasis in sister        HISTORY OF PRESENTING ILLNESS:  Essence Rubio 47 y.o. , a second , is here for follow-up of psoriatic arthritis.  This is manifested with inflammatory arthropathy, enthesitis.  She has done so much better now.  She noted that the Otezla has been very helpful.  She noted mild discomfort in her hips and none in the knees anymore which is a remarkable improvement.  She was back to school now and has had some discomfort in the hip area.  There is no stiffness in the morning beyond the first 20 minutes.  She has had no fever or weight loss blurry vision eye redness mild posterior nausea cough or rash.  She does not have history of depression. Her sister has psoriasis.  She is on methotrexate, hydroxychloroquine.. She is a nonsmoker, alcohol rarely, no regular exercise, she gets she does not get a refreshing sleep at night. She has had fracture of her ankle or right sided, and  2004. She had bunion repair. She is status post hysteroscopy and ablation. She is unable to take the penicillin because of anaphylactic shock. Further historical information, including ROS and limitation in  activities as noted in the multidimensional health assessment questionnaire scanned in the EMR and in the assessment and plan section.. ALLERGIES:Penicillins    PAST MEDICAL/ACTIVE PROBLEMS/MEDICATION/SOCIAL DATA  Past Medical History:   Diagnosis Date     Asthma     aggravated with colds     History of anesthesia complications      PONV (postoperative nausea and vomiting)      Rheumatoid arthritis (H)      History   Smoking Status     Never Smoker   Smokeless Tobacco     Never Used     Patient Active Problem List   Diagnosis     Gallstone     Inflammatory polyarthritis (H)     High risk medication use     S/P vaginal hysterectomy     Trochanteric bursitis of both hips     Acute bilateral low back pain with sciatica     Family history of psoriasis in sister     Piriformis syndrome, unspecified laterality     Enthesitis -knee     Psoriatic arthritis (H)     Current Outpatient Prescriptions   Medication Sig Dispense Refill     acetaminophen (TYLENOL) 500 MG tablet Take 1,000 mg by mouth every 6 (six) hours as needed for pain.       cetirizine (ZYRTEC) 10 MG tablet Take 10 mg by mouth daily.       ergocalciferol (VITAMIN D2) 50,000 unit capsule Take 50,000 Units by mouth every 7 days.       estradiol valerate (DELESTROGEN) 20 mg/mL injection Inject 20 mg into the shoulder, thigh, or buttocks every 28 days.       folic acid (FOLVITE) 1 MG tablet TAKE 1 TABLET DAILY 90 tablet 3     hydroxychloroquine (PLAQUENIL) 200 mg tablet TAKE 1 TABLET TWICE A  tablet 1     methotrexate 2.5 MG tablet TAKE 10 TABLETS ONCE A WEEK 120 tablet 0     predniSONE (DELTASONE) 5 MG tablet Take 7.5 mg/d prednisone PO for 1 month, reduce to 5 mg/d for the next month, and then reduce to 2.5 mg/d for the third month and then stop. 30  tablet 1     No current facility-administered medications for this visit.      DETAILED EXAMINATION  08/29/18  :  Vitals:    08/29/18 1636   BP: 108/68   Patient Site: Right Arm   Patient Position: Sitting   Cuff Size: Adult Regular   Pulse: 80   Weight: 205 lb (93 kg)     Alert oriented. Head including the face is examined for malar rash, heliotropes, scarring, lupus pernio. Eyes examined for redness such as in episcleritis/scleritis, periorbital lesions.   Neck/ Face examined for parotid gland swelling, range of motion of neck.  Left upper and lower and right upper and lower extremities examined for tenderness, swelling, warmth of the appendicular joints, range of motion, edema, rash.  Some of the important findings included: She does not have residual enthesitis such as the knees, minimal trochanteric area tenderness.  There is no synovitis and palpable joints of the upper extremity no dactylitis of the digits.             LAB / IMAGING DATA:  ALT   Date Value Ref Range Status   08/22/2018 30 0 - 45 U/L Final   05/17/2018 19 0 - 45 U/L Final   02/19/2018 28 0 - 45 U/L Final     Albumin   Date Value Ref Range Status   08/22/2018 3.8 3.5 - 5.0 g/dL Final   05/17/2018 3.5 3.5 - 5.0 g/dL Final   02/19/2018 3.4 (L) 3.5 - 5.0 g/dL Final     Creatinine   Date Value Ref Range Status   08/22/2018 0.69 0.60 - 1.10 mg/dL Final   05/17/2018 0.70 0.60 - 1.10 mg/dL Final   02/19/2018 0.63 0.60 - 1.10 mg/dL Final       WBC   Date Value Ref Range Status   08/22/2018 6.6 4.0 - 11.0 thou/uL Final   05/17/2018 9.1 4.0 - 11.0 thou/uL Final   09/10/2015 9.1 4.0 - 11.0 thou/uL Final     Hemoglobin   Date Value Ref Range Status   08/22/2018 12.4 12.0 - 16.0 g/dL Final   05/17/2018 12.0 12.0 - 16.0 g/dL Final   02/19/2018 12.4 12.0 - 16.0 g/dL Final     Platelets   Date Value Ref Range Status   08/22/2018 295 140 - 440 thou/uL Final   05/17/2018 308 140 - 440 thou/uL Final   02/19/2018 266 140 - 440 thou/uL Final       Lab Results    Component Value Date    RF <15.0 08/30/2017    SEDRATE 5 08/30/2017

## 2021-06-22 NOTE — PROGRESS NOTES
ASSESSMENT AND PLAN:  Essence Rubio 48 y.o. female is here for follow-up of psoriatic arthritis characterized by enthesitis and inflammatory arthropathy, family history of psoriasis and a sister, finally seems to be doing quite a bit better with combination of methotrexate, Otezla.  She is no longer on prednisone.  We discussed the labs.  She is going to do them every 3 months now.  As well as she is doing we will meet here in 6 months.             Diagnoses and all orders for this visit:    Psoriatic arthritis (H)  -     apremilast 30 mg Tab; Take 30 mg by mouth 2 (two) times a day.  Dispense: 60 tablet; Refill: 3    Enthesitis -knee  -     apremilast 30 mg Tab; Take 30 mg by mouth 2 (two) times a day.  Dispense: 60 tablet; Refill: 3    Family history of psoriasis in sister    High risk medication use          HISTORY OF PRESENTING ILLNESS:  Essence Rubio 47 y.o. , a second , is here for follow-up of psoriatic arthritis.  This is manifested with inflammatory arthropathy, enthesitis.  She has done so much better now.  She noted that the Otezla has been very helpful.  She has noted pain level to be 1.0/10, mild discomfort in her right hand and wrist, toward the end of the day she may have stiffness, she has noted some neck discomfort, able to do most of her day-to-day activities without any or with some difficulty.  Morning stiffness no more than 30 minutes.  She has not had fever weight loss blurry vision eye redness mouth ulcer nausea cough or rash.  As she went back to school she had more pain and achiness she had a brief course of steroids which she has finished now and did well after that.. She is a nonsmoker, alcohol rarely, no regular exercise, she gets she does not get a refreshing sleep at night. She has had fracture of her ankle or right sided, and 2004. She had bunion repair. She is status post hysteroscopy and ablation. She is unable to take the penicillin because of anaphylactic  shock. Further historical information, including ROS and limitation in  activities as noted in the multidimensional health assessment questionnaire scanned in the EMR and in the assessment and plan section.. ALLERGIES:Penicillins    PAST MEDICAL/ACTIVE PROBLEMS/MEDICATION/SOCIAL DATA  Past Medical History:   Diagnosis Date     Asthma     aggravated with colds     History of anesthesia complications      PONV (postoperative nausea and vomiting)      Rheumatoid arthritis (H)      Social History     Tobacco Use   Smoking Status Never Smoker   Smokeless Tobacco Never Used     Patient Active Problem List   Diagnosis     Gallstone     High risk medication use     S/P vaginal hysterectomy     Trochanteric bursitis of both hips     Acute bilateral low back pain with sciatica     Family history of psoriasis in sister     Piriformis syndrome, unspecified laterality     Enthesitis -knee     Psoriatic arthritis (H)     Current Outpatient Medications   Medication Sig Dispense Refill     acetaminophen (TYLENOL) 500 MG tablet Take 1,000 mg by mouth every 6 (six) hours as needed for pain.       cetirizine (ZYRTEC) 10 MG tablet Take 10 mg by mouth daily.       ergocalciferol (VITAMIN D2) 50,000 unit capsule Take 50,000 Units by mouth every 7 days.       estradiol valerate (DELESTROGEN) 20 mg/mL injection Inject 20 mg into the shoulder, thigh, or buttocks every 28 days.       folic acid (FOLVITE) 1 MG tablet TAKE 1 TABLET DAILY 90 tablet 3     methotrexate 2.5 MG tablet TAKE 10 TABLETS ONCE A WEEK 120 tablet 0     apremilast 30 mg Tab Take 30 mg by mouth 2 (two) times a day. 60 tablet 3     No current facility-administered medications for this visit.      DETAILED EXAMINATION  12/05/18  :  Vitals:    12/05/18 1650   BP: 130/80   Cuff Size: Adult Regular   Pulse: 80   Weight: 195 lb (88.5 kg)     Alert oriented. Head including the face is examined for malar rash, heliotropes, scarring, lupus pernio. Eyes examined for redness such as  in episcleritis/scleritis, periorbital lesions.   Neck/ Face examined for parotid gland swelling, range of motion of neck.  Left upper and lower and right upper and lower extremities examined for tenderness, swelling, warmth of the appendicular joints, range of motion, edema, rash.  Some of the important findings included: She does not have synovitis in any of the palpable joints of upper extremities full range of motion of the shoulders knees without effusion warmth or JLT.  She does not have dactylitis of the digits.           LAB / IMAGING DATA:  ALT   Date Value Ref Range Status   11/27/2018 44 0 - 45 U/L Final   08/22/2018 30 0 - 45 U/L Final   05/17/2018 19 0 - 45 U/L Final     Albumin   Date Value Ref Range Status   11/27/2018 3.8 3.5 - 5.0 g/dL Final   08/22/2018 3.8 3.5 - 5.0 g/dL Final   05/17/2018 3.5 3.5 - 5.0 g/dL Final     Creatinine   Date Value Ref Range Status   11/27/2018 0.69 0.60 - 1.10 mg/dL Final   08/22/2018 0.69 0.60 - 1.10 mg/dL Final   05/17/2018 0.70 0.60 - 1.10 mg/dL Final       WBC   Date Value Ref Range Status   11/27/2018 6.5 4.0 - 11.0 thou/uL Final   08/22/2018 6.6 4.0 - 11.0 thou/uL Final   09/10/2015 9.1 4.0 - 11.0 thou/uL Final     Hemoglobin   Date Value Ref Range Status   11/27/2018 12.6 12.0 - 16.0 g/dL Final   08/22/2018 12.4 12.0 - 16.0 g/dL Final   05/17/2018 12.0 12.0 - 16.0 g/dL Final     Platelets   Date Value Ref Range Status   11/27/2018 293 140 - 440 thou/uL Final   08/22/2018 295 140 - 440 thou/uL Final   05/17/2018 308 140 - 440 thou/uL Final       Lab Results   Component Value Date    RF <15.0 08/30/2017    SEDRATE 5 08/30/2017

## 2021-06-27 ENCOUNTER — HEALTH MAINTENANCE LETTER (OUTPATIENT)
Age: 51
End: 2021-06-27

## 2021-07-03 NOTE — ADDENDUM NOTE
Addendum Note by Rachael Campa RN at 1/9/2018  3:45 PM     Author: Rachael Campa RN Service: -- Author Type: Registered Nurse    Filed: 1/9/2018  3:45 PM Encounter Date: 1/5/2018 Status: Signed    : Rachael Campa RN (Registered Nurse)    Addended by: RACHAEL CAMPA on: 1/9/2018 03:45 PM        Modules accepted: Orders

## 2021-07-13 ENCOUNTER — RECORDS - HEALTHEAST (OUTPATIENT)
Dept: ADMINISTRATIVE | Facility: CLINIC | Age: 51
End: 2021-07-13

## 2021-07-21 ENCOUNTER — RECORDS - HEALTHEAST (OUTPATIENT)
Dept: ADMINISTRATIVE | Facility: CLINIC | Age: 51
End: 2021-07-21

## 2021-07-29 ENCOUNTER — HOSPITAL ENCOUNTER (OUTPATIENT)
Dept: MAMMOGRAPHY | Facility: CLINIC | Age: 51
Discharge: HOME | End: 2021-07-29
Attending: FAMILY MEDICINE | Admitting: FAMILY MEDICINE
Payer: COMMERCIAL

## 2021-07-29 DIAGNOSIS — Z12.31 VISIT FOR SCREENING MAMMOGRAM: ICD-10-CM

## 2021-07-29 PROCEDURE — 77067 SCR MAMMO BI INCL CAD: CPT

## 2021-08-05 ENCOUNTER — ANCILLARY PROCEDURE (OUTPATIENT)
Dept: MAMMOGRAPHY | Facility: CLINIC | Age: 51
End: 2021-08-05
Attending: FAMILY MEDICINE
Payer: COMMERCIAL

## 2021-08-05 DIAGNOSIS — N64.89 BREAST ASYMMETRY: ICD-10-CM

## 2021-08-05 PROCEDURE — 76642 ULTRASOUND BREAST LIMITED: CPT | Mod: LT

## 2021-08-09 DIAGNOSIS — L40.50 PSORIATIC ARTHRITIS (H): ICD-10-CM

## 2021-08-09 DIAGNOSIS — M77.9 ENTHESITIS: ICD-10-CM

## 2021-08-10 RX ORDER — APREMILAST 30 MG/1
TABLET, FILM COATED ORAL
Qty: 180 TABLET | Refills: 0 | Status: SHIPPED | OUTPATIENT
Start: 2021-08-10 | End: 2021-12-09

## 2021-08-17 ENCOUNTER — LAB REQUISITION (OUTPATIENT)
Dept: LAB | Facility: CLINIC | Age: 51
End: 2021-08-17

## 2021-08-17 DIAGNOSIS — E04.9 NONTOXIC GOITER, UNSPECIFIED: ICD-10-CM

## 2021-08-17 DIAGNOSIS — Z13.220 ENCOUNTER FOR SCREENING FOR LIPOID DISORDERS: ICD-10-CM

## 2021-08-17 LAB
CHOLEST SERPL-MCNC: 200 MG/DL
FASTING STATUS PATIENT QL REPORTED: ABNORMAL
HDLC SERPL-MCNC: 61 MG/DL
LDLC SERPL CALC-MCNC: 83 MG/DL
TRIGL SERPL-MCNC: 279 MG/DL
TSH SERPL DL<=0.005 MIU/L-ACNC: 0.82 UIU/ML (ref 0.3–5)

## 2021-08-17 PROCEDURE — 80061 LIPID PANEL: CPT | Performed by: FAMILY MEDICINE

## 2021-08-17 PROCEDURE — 84443 ASSAY THYROID STIM HORMONE: CPT | Performed by: FAMILY MEDICINE

## 2021-08-23 DIAGNOSIS — M77.9 ENTHESITIS: ICD-10-CM

## 2021-08-23 DIAGNOSIS — M06.4 INFLAMMATORY POLYARTHRITIS (H): ICD-10-CM

## 2021-08-23 DIAGNOSIS — L40.50 PSORIATIC ARTHRITIS (H): ICD-10-CM

## 2021-08-30 ENCOUNTER — LAB (OUTPATIENT)
Dept: LAB | Facility: CLINIC | Age: 51
End: 2021-08-30
Payer: COMMERCIAL

## 2021-08-30 DIAGNOSIS — L40.50 PSORIATIC ARTHRITIS (H): ICD-10-CM

## 2021-08-30 DIAGNOSIS — M06.4 INFLAMMATORY POLYARTHRITIS (H): ICD-10-CM

## 2021-08-30 LAB
ALBUMIN SERPL-MCNC: 3.7 G/DL (ref 3.5–5)
ALT SERPL W P-5'-P-CCNC: 25 U/L (ref 0–45)
CREAT SERPL-MCNC: 0.68 MG/DL (ref 0.6–1.1)
ERYTHROCYTE [DISTWIDTH] IN BLOOD BY AUTOMATED COUNT: 14.2 % (ref 10–15)
GFR SERPL CREATININE-BSD FRML MDRD: >90 ML/MIN/1.73M2
HCT VFR BLD AUTO: 39.3 % (ref 35–47)
HGB BLD-MCNC: 12.7 G/DL (ref 11.7–15.7)
MCH RBC QN AUTO: 30 PG (ref 26.5–33)
MCHC RBC AUTO-ENTMCNC: 32.3 G/DL (ref 31.5–36.5)
MCV RBC AUTO: 93 FL (ref 78–100)
PLATELET # BLD AUTO: 277 10E3/UL (ref 150–450)
RBC # BLD AUTO: 4.24 10E6/UL (ref 3.8–5.2)
WBC # BLD AUTO: 5 10E3/UL (ref 4–11)

## 2021-08-30 PROCEDURE — 36415 COLL VENOUS BLD VENIPUNCTURE: CPT

## 2021-08-30 PROCEDURE — 85027 COMPLETE CBC AUTOMATED: CPT

## 2021-08-30 PROCEDURE — 82040 ASSAY OF SERUM ALBUMIN: CPT

## 2021-08-30 PROCEDURE — 84460 ALANINE AMINO (ALT) (SGPT): CPT

## 2021-08-30 PROCEDURE — 82565 ASSAY OF CREATININE: CPT

## 2021-09-01 RX ORDER — METHOTREXATE 2.5 MG/1
TABLET ORAL
Qty: 120 TABLET | Refills: 0 | Status: SHIPPED | OUTPATIENT
Start: 2021-09-01 | End: 2021-12-02

## 2021-10-14 ENCOUNTER — OFFICE VISIT (OUTPATIENT)
Dept: RHEUMATOLOGY | Facility: CLINIC | Age: 51
End: 2021-10-14
Payer: COMMERCIAL

## 2021-10-14 VITALS
WEIGHT: 172.5 LBS | SYSTOLIC BLOOD PRESSURE: 130 MMHG | BODY MASS INDEX: 26.62 KG/M2 | DIASTOLIC BLOOD PRESSURE: 62 MMHG | HEART RATE: 70 BPM

## 2021-10-14 DIAGNOSIS — L40.50 PSORIATIC ARTHRITIS (H): Primary | ICD-10-CM

## 2021-10-14 DIAGNOSIS — M77.9 ENTHESITIS: ICD-10-CM

## 2021-10-14 DIAGNOSIS — M70.62 TROCHANTERIC BURSITIS OF BOTH HIPS: ICD-10-CM

## 2021-10-14 DIAGNOSIS — M35.3 POLYMYALGIA (H): ICD-10-CM

## 2021-10-14 DIAGNOSIS — M70.61 TROCHANTERIC BURSITIS OF BOTH HIPS: ICD-10-CM

## 2021-10-14 PROCEDURE — 20610 DRAIN/INJ JOINT/BURSA W/O US: CPT | Mod: 50 | Performed by: INTERNAL MEDICINE

## 2021-10-14 PROCEDURE — 99214 OFFICE O/P EST MOD 30 MIN: CPT | Mod: 25 | Performed by: INTERNAL MEDICINE

## 2021-10-14 RX ORDER — DULOXETIN HYDROCHLORIDE 20 MG/1
20 CAPSULE, DELAYED RELEASE ORAL DAILY
Qty: 30 CAPSULE | Refills: 2 | Status: SHIPPED | OUTPATIENT
Start: 2021-10-14 | End: 2022-01-14

## 2021-10-14 RX ORDER — TRIAMCINOLONE ACETONIDE 40 MG/ML
40 INJECTION, SUSPENSION INTRA-ARTICULAR; INTRAMUSCULAR ONCE
Status: COMPLETED | OUTPATIENT
Start: 2021-10-14 | End: 2021-10-14

## 2021-10-14 RX ADMIN — TRIAMCINOLONE ACETONIDE 40 MG: 40 INJECTION, SUSPENSION INTRA-ARTICULAR; INTRAMUSCULAR at 11:26

## 2021-10-14 RX ADMIN — TRIAMCINOLONE ACETONIDE 40 MG: 40 INJECTION, SUSPENSION INTRA-ARTICULAR; INTRAMUSCULAR at 11:25

## 2021-10-14 NOTE — PROGRESS NOTES
Rheumatology follow-up visit note     Essence is a 51 year old female presents today for follow-up.    Essence was seen today for recheck.    Diagnoses and all orders for this visit:    Psoriatic arthritis (H)  -     XR Pelvis and Hip Bilateral 2 Views; Future  -     XR Knee AP/Lat Standing Bilateral; Future  -     Cancel: ASPIRATION/INJECTION MAJOR JOINT  -     triamcinolone (KENALOG-40) injection 40 mg  -     triamcinolone (KENALOG-40) injection 40 mg  -     XR Pelvis and Hip Bilateral 2 Views  -     XR Knee AP/Lat Standing Bilateral    Enthesitis    Trochanteric bursitis of both hips  -     XR Pelvis and Hip Bilateral 2 Views; Future  -     XR Knee AP/Lat Standing Bilateral; Future  -     Cancel: ASPIRATION/INJECTION MAJOR JOINT  -     triamcinolone (KENALOG-40) injection 40 mg  -     triamcinolone (KENALOG-40) injection 40 mg  -     XR Pelvis and Hip Bilateral 2 Views  -     XR Knee AP/Lat Standing Bilateral  -     ASPIRATION/INJECTION MAJOR JOINT    Polymyalgia (H)  -     DULoxetine (CYMBALTA) 20 MG capsule; Take 1 capsule (20 mg) by mouth daily        This patient with psoriatic arthritis has pain which is different from her usual PSA, she has evidence of trochanteric bursitis, in addition she has more generalized aches and pains poor quality of sleep.  We discussed how the likelihood of psoriatic arthritis flaring up appears to be low.  She would like to try corticosteroid injections were done after pros and cons are outlined 40 mg of Kenalog into each trochanteric area.  We discussed duloxetine.  Take x-rays of the knees and hip joints today.  We will meet here in 3 months.    Follow up in 3 months.    HPI    Essence Rubio is a 51 year old female is here for follow-up of  psoriatic arthritis, sisters history of psoriasis her own of enthesitis and inflammatory joint disease, on methotrexate, Otezla, folic acid.  Over the past month she has noted increasing pain.  This initially was responsive to  over-the-counter measures now it is not.  This is reached a point where she is unable to have a good night sleep, napping really more so than sleeping.  On 2 occasions she tried prednisone and each time had thrush, within 2 days of starting prednisone.  She noticed some of the worst pains around her hips, shoulders.  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           DETAILED EXAMINATION  10/14/21  :    Vitals:    10/14/21 0823   BP: 130/62   Pulse: 70   Weight: 78.2 kg (172 lb 8 oz)     Alert oriented. Head including the face is examined for malar rash, heliotropes, scarring, lupus pernio. Eyes examined for redness such as in episcleritis/scleritis, periorbital lesions.   Neck/ Face examined for parotid gland swelling, range of motion of neck.  Left upper and lower and right upper and lower extremities examined for tenderness, swelling, warmth of the appendicular joints, range of motion, edema, rash.  Some of the important findings included: she does not have evidence of synovitis in the palpable joints of the upper extremities or the lower extremities.  No significant deformities of the digits.  no Heberden nodes.  Range of motion of the shoulders  show full abduction.  No JLT effusion or warmth of the knees.  she does not have dactylitis of the digits or the toes.  She has marked tenderness in the trochanteric areas.  There is no enthesitis such as Achilles.  She was tender in the proximal lower extremities and across the trapezius.     Patient Active Problem List    Diagnosis Date Noted     1st MTP arthritis 05/20/2019     Priority: Medium     Enthesitis -knee 05/22/2018     Priority: Medium     Psoriatic arthritis (H) 05/22/2018     Priority: Medium     Piriformis syndrome, unspecified laterality 10/17/2017     Priority: Medium     Family history of psoriasis in sister  2017     Priority: Medium     Acute bilateral low back pain with sciatica 2017     Priority: Medium     Trochanteric bursitis of both hips 2016     Priority: Medium     S/P vaginal hysterectomy 2016     Priority: Medium     High risk medication use 2016     Priority: Medium     Gallstone 09/10/2015     Priority: Medium     Past Surgical History:   Procedure Laterality Date     ABDOMEN SURGERY      lap for endometriosis     ANKLE ARTHROPLASTY      ankle surgery; pt states she has plates, no pins.      SECTION       HYSTERECTOMY       LAPAROSCOPIC CHOLECYSTECTOMY N/A 2015    Procedure: CHOLECYSTECTOMY LAPAROSCOPIC;  Surgeon: Nithin PNAG MD;  Location: [facility];  Service:      OOPHORECTOMY  2019     OTHER SURGICAL HISTORY  2016    Laparoscopic vaginal hysterectomy, bilateral salpingectomyfailed ablation / [facility]      TONSILLECTOMY        Past Medical History:   Diagnosis Date     Asthma     aggravated with colds     History of anesthesia complications      PONV (postoperative nausea and vomiting)      Rheumatoid arthritis (H)      Allergies   Allergen Reactions     Penicillins Anaphylaxis     Penicillin V Potassium [Penicillin V] Other (See Comments)     Current Outpatient Medications   Medication Sig Dispense Refill     acetaminophen (TYLENOL) 500 MG tablet [ACETAMINOPHEN (TYLENOL) 500 MG TABLET] Take 1,000 mg by mouth every 6 (six) hours as needed for pain.       aspirin 81 MG EC tablet [ASPIRIN 81 MG EC TABLET] Take 81 mg by mouth daily.       cetirizine (ZYRTEC) 10 MG tablet [CETIRIZINE (ZYRTEC) 10 MG TABLET] Take 10 mg by mouth daily.       ergocalciferol (VITAMIN D2) 50,000 unit capsule Take 2,000 Units by mouth daily        estradiol (ESTRACE) 2 MG tablet [ESTRADIOL (ESTRACE) 2 MG TABLET] Take 2 mg by mouth daily.              folic acid (FOLVITE) 1 MG tablet [FOLIC ACID (FOLVITE) 1 MG TABLET] TAKE 1 TABLET DAILY 90 tablet 1      methotrexate sodium 2.5 MG TABS TAKE 10 TABLETS ONCE A WEEK 120 tablet 0     OTEZLA 30 MG tablet TAKE 1 TABLET TWICE A  tablet 0     family history includes Cancer in her sister; Diabetes in her maternal grandmother and mother; Hyperlipidemia in her father; Hypertension in her father; Rheumatoid Arthritis in her mother.     Social Connections:      Frequency of Communication with Friends and Family:      Frequency of Social Gatherings with Friends and Family:      Attends Rastafarian Services:      Active Member of Clubs or Organizations:      Attends Club or Organization Meetings:      Marital Status:           WBC Count   Date Value Ref Range Status   08/30/2021 5.0 4.0 - 11.0 10e3/uL Final     RBC Count   Date Value Ref Range Status   08/30/2021 4.24 3.80 - 5.20 10e6/uL Final     Hemoglobin   Date Value Ref Range Status   08/30/2021 12.7 11.7 - 15.7 g/dL Final     Hematocrit   Date Value Ref Range Status   08/30/2021 39.3 35.0 - 47.0 % Final     MCV   Date Value Ref Range Status   08/30/2021 93 78 - 100 fL Final     MCH   Date Value Ref Range Status   08/30/2021 30.0 26.5 - 33.0 pg Final     Platelet Count   Date Value Ref Range Status   08/30/2021 277 150 - 450 10e3/uL Final     ALT   Date Value Ref Range Status   08/30/2021 25 0 - 45 U/L Final     Albumin   Date Value Ref Range Status   08/30/2021 3.7 3.5 - 5.0 g/dL Final     Creatinine   Date Value Ref Range Status   08/30/2021 0.68 0.60 - 1.10 mg/dL Final     GFR Estimate   Date Value Ref Range Status   08/30/2021 >90 >60 mL/min/1.73m2 Final     Comment:     As of July 11, 2021, eGFR is calculated by the CKD-EPI creatinine equation, without race adjustment. eGFR can be influenced by muscle mass, exercise, and diet. The reported eGFR is an estimation only and is only applicable if the renal function is stable.   06/07/2021 >60 >60 mL/min/1.73m2 Final     GFR Estimate If Black   Date Value Ref Range Status   06/07/2021 >60 >60 mL/min/1.73m2 Final

## 2021-10-17 ENCOUNTER — HEALTH MAINTENANCE LETTER (OUTPATIENT)
Age: 51
End: 2021-10-17

## 2021-11-04 DIAGNOSIS — M06.4 INFLAMMATORY POLYARTHRITIS (H): ICD-10-CM

## 2021-11-05 RX ORDER — FOLIC ACID 1 MG/1
TABLET ORAL
Qty: 90 TABLET | Refills: 1 | Status: SHIPPED | OUTPATIENT
Start: 2021-11-05 | End: 2022-05-04

## 2021-11-23 ENCOUNTER — TELEPHONE (OUTPATIENT)
Dept: RHEUMATOLOGY | Facility: CLINIC | Age: 51
End: 2021-11-23
Payer: COMMERCIAL

## 2021-11-23 DIAGNOSIS — L40.50 PSORIATIC ARTHRITIS (H): ICD-10-CM

## 2021-11-23 DIAGNOSIS — M06.4 INFLAMMATORY POLYARTHRITIS (H): ICD-10-CM

## 2021-11-23 DIAGNOSIS — M77.9 ENTHESITIS: ICD-10-CM

## 2021-11-30 ENCOUNTER — LAB (OUTPATIENT)
Dept: LAB | Facility: CLINIC | Age: 51
End: 2021-11-30
Payer: COMMERCIAL

## 2021-11-30 DIAGNOSIS — L40.50 PSORIATIC ARTHRITIS (H): ICD-10-CM

## 2021-11-30 DIAGNOSIS — M06.4 INFLAMMATORY POLYARTHRITIS (H): ICD-10-CM

## 2021-11-30 LAB
ERYTHROCYTE [DISTWIDTH] IN BLOOD BY AUTOMATED COUNT: 14 % (ref 10–15)
HCT VFR BLD AUTO: 36.1 % (ref 35–47)
HGB BLD-MCNC: 12 G/DL (ref 11.7–15.7)
MCH RBC QN AUTO: 30 PG (ref 26.5–33)
MCHC RBC AUTO-ENTMCNC: 33.2 G/DL (ref 31.5–36.5)
MCV RBC AUTO: 90 FL (ref 78–100)
PLATELET # BLD AUTO: 270 10E3/UL (ref 150–450)
RBC # BLD AUTO: 4 10E6/UL (ref 3.8–5.2)
WBC # BLD AUTO: 5.2 10E3/UL (ref 4–11)

## 2021-11-30 PROCEDURE — 82040 ASSAY OF SERUM ALBUMIN: CPT

## 2021-11-30 PROCEDURE — 84460 ALANINE AMINO (ALT) (SGPT): CPT

## 2021-11-30 PROCEDURE — 36415 COLL VENOUS BLD VENIPUNCTURE: CPT

## 2021-11-30 PROCEDURE — 82565 ASSAY OF CREATININE: CPT

## 2021-11-30 PROCEDURE — 85027 COMPLETE CBC AUTOMATED: CPT

## 2021-12-01 LAB
ALBUMIN SERPL-MCNC: 3.7 G/DL (ref 3.5–5)
ALT SERPL W P-5'-P-CCNC: 27 U/L (ref 0–45)
CREAT SERPL-MCNC: 0.72 MG/DL (ref 0.6–1.1)
GFR SERPL CREATININE-BSD FRML MDRD: >90 ML/MIN/1.73M2

## 2021-12-02 RX ORDER — METHOTREXATE 2.5 MG/1
TABLET ORAL
Qty: 120 TABLET | Refills: 0 | Status: SHIPPED | OUTPATIENT
Start: 2021-12-02 | End: 2022-02-16

## 2021-12-09 DIAGNOSIS — L40.50 PSORIATIC ARTHRITIS (H): ICD-10-CM

## 2021-12-09 DIAGNOSIS — M77.9 ENTHESITIS: ICD-10-CM

## 2021-12-09 RX ORDER — APREMILAST 30 MG/1
TABLET, FILM COATED ORAL
Qty: 180 TABLET | Refills: 0 | Status: SHIPPED | OUTPATIENT
Start: 2021-12-09 | End: 2022-02-23

## 2022-01-13 ENCOUNTER — OFFICE VISIT (OUTPATIENT)
Dept: RHEUMATOLOGY | Facility: CLINIC | Age: 52
End: 2022-01-13
Payer: COMMERCIAL

## 2022-01-13 VITALS — SYSTOLIC BLOOD PRESSURE: 120 MMHG | HEART RATE: 76 BPM | DIASTOLIC BLOOD PRESSURE: 60 MMHG

## 2022-01-13 DIAGNOSIS — M77.9 ENTHESITIS: ICD-10-CM

## 2022-01-13 DIAGNOSIS — M70.61 TROCHANTERIC BURSITIS OF BOTH HIPS: ICD-10-CM

## 2022-01-13 DIAGNOSIS — M35.3 POLYMYALGIA (H): ICD-10-CM

## 2022-01-13 DIAGNOSIS — L40.50 PSORIATIC ARTHRITIS (H): Primary | ICD-10-CM

## 2022-01-13 DIAGNOSIS — M70.62 TROCHANTERIC BURSITIS OF BOTH HIPS: ICD-10-CM

## 2022-01-13 PROCEDURE — 99214 OFFICE O/P EST MOD 30 MIN: CPT | Performed by: INTERNAL MEDICINE

## 2022-01-13 RX ORDER — PREDNISONE 10 MG/1
10 TABLET ORAL DAILY
Qty: 30 TABLET | Refills: 0 | Status: SHIPPED | OUTPATIENT
Start: 2022-01-13 | End: 2022-02-12

## 2022-01-13 NOTE — PROGRESS NOTES
Rheumatology follow-up visit note     Essnece is a 51 year old female presents today for follow-up.    Essence was seen today for recheck.    Diagnoses and all orders for this visit:    Psoriatic arthritis (H)  -     predniSONE (DELTASONE) 10 MG tablet; Take 1 tablet (10 mg) by mouth daily    Enthesitis    Trochanteric bursitis of both hips    Polymyalgia (H)        This patient with psoriatic arthritis, family history of psoriasis is here with increasing pain in her trochanteric areas, knees, ankles especially nocturnally to the point where she has difficult time getting a restful sleep unless he takes ibuprofen which she realizes is not a good idea with methotrexate or even without.  In the past corticosteroid injections have been very helpful as well as the trochanteric region pain is concerned.  Duloxetine has provided some relief but current dose causes a lot of grogginess for her that increasing dose would be not an option.  We discussed various etiologies of pain including trochanteric bursitis associated with osteoarthritis with the knee though the x-rays were normal.  We also discussed that sometimes psoriatic arthritis can manifest in some of these situations such as with enthesitis.  She is going to take prednisone as noted for the next 14 check with your primary physician to write days and monitor her symptoms we will meet in 6 weeks.     HPI    Essence Rubio is a 51 year old female is here for follow-up of  psoriatic arthritis, sisters history of psoriasis her own of enthesitis and inflammatory joint disease, on methotrexate, Otezla, folic acid.  Over the past month she has noted increasing pain.  This initially was responsive to over-the-counter measures now it is not.  This is reached a point where she is unable to have a good night sleep, napping really more so than sleeping.  On 2 occasions she tried prednisone and each time had thrush, within 2 days of starting prednisone.  She noticed some  of the worst pains around her hips, knees and ankles.  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      DETAILED EXAMINATION  22  :    Vitals:    22 1504   BP: 120/60   Pulse: 76     Alert oriented. Head including the face is examined for malar rash, heliotropes, scarring, lupus pernio. Eyes examined for redness such as in episcleritis/scleritis, periorbital lesions.   Neck/ Face examined for parotid gland swelling, range of motion of neck.  Left upper and lower and right upper and lower extremities examined for tenderness, swelling, warmth of the appendicular joints, range of motion, edema, rash.  Some of the important findings included: she does not have evidence of synovitis in the palpable joints of the upper extremities.  No dactylitis of digits.  She is mildly tender in the trochanteric areas.  She does not have enthesitis such as Achilles.  Patient Active Problem List    Diagnosis Date Noted     1st MTP arthritis 2019     Priority: Medium     Enthesitis -knee 2018     Priority: Medium     Psoriatic arthritis (H) 2018     Priority: Medium     Piriformis syndrome, unspecified laterality 10/17/2017     Priority: Medium     Family history of psoriasis in sister 2017     Priority: Medium     Acute bilateral low back pain with sciatica 2017     Priority: Medium     Trochanteric bursitis of both hips 2016     Priority: Medium     S/P vaginal hysterectomy 2016     Priority: Medium     High risk medication use 2016     Priority: Medium     Gallstone 09/10/2015     Priority: Medium     Past Surgical History:   Procedure Laterality Date     ABDOMEN SURGERY      lap for endometriosis     ANKLE ARTHROPLASTY      ankle surgery; pt states she has plates, no pins.      SECTION       HYSTERECTOMY  2016     LAPAROSCOPIC  CHOLECYSTECTOMY N/A 9/11/2015    Procedure: CHOLECYSTECTOMY LAPAROSCOPIC;  Surgeon: Nithin PANG MD;  Location: [facility] OR;  Service:      OOPHORECTOMY  05/2019     OTHER SURGICAL HISTORY  07/13/2016    Laparoscopic vaginal hysterectomy, bilateral salpingectomyfailed ablation / [facility]      TONSILLECTOMY        Past Medical History:   Diagnosis Date     Asthma     aggravated with colds     History of anesthesia complications      PONV (postoperative nausea and vomiting)      Rheumatoid arthritis (H)      Allergies   Allergen Reactions     Penicillins Anaphylaxis     Penicillin V Potassium [Penicillin V] Other (See Comments)     Current Outpatient Medications   Medication Sig Dispense Refill     aspirin 81 MG EC tablet [ASPIRIN 81 MG EC TABLET] Take 81 mg by mouth daily.       cetirizine (ZYRTEC) 10 MG tablet [CETIRIZINE (ZYRTEC) 10 MG TABLET] Take 10 mg by mouth daily.       DULoxetine (CYMBALTA) 20 MG capsule Take 1 capsule (20 mg) by mouth daily 30 capsule 2     ergocalciferol (VITAMIN D2) 50,000 unit capsule Take 2,000 Units by mouth daily        estradiol (ESTRACE) 2 MG tablet [ESTRADIOL (ESTRACE) 2 MG TABLET] Take 2 mg by mouth daily.              folic acid (FOLVITE) 1 MG tablet TAKE 1 TABLET DAILY 90 tablet 1     methotrexate sodium 2.5 MG TABS TAKE 10 TABLETS ONCE A WEEK 120 tablet 0     OTEZLA 30 MG tablet TAKE 1 TABLET TWICE A  tablet 0     family history includes Cancer in her sister; Diabetes in her maternal grandmother and mother; Hyperlipidemia in her father; Hypertension in her father; Rheumatoid Arthritis in her mother.  Social Connections: Not on file          WBC Count   Date Value Ref Range Status   11/30/2021 5.2 4.0 - 11.0 10e3/uL Final     RBC Count   Date Value Ref Range Status   11/30/2021 4.00 3.80 - 5.20 10e6/uL Final     Hemoglobin   Date Value Ref Range Status   11/30/2021 12.0 11.7 - 15.7 g/dL Final     Hematocrit   Date Value Ref Range Status   11/30/2021  36.1 35.0 - 47.0 % Final     MCV   Date Value Ref Range Status   11/30/2021 90 78 - 100 fL Final     MCH   Date Value Ref Range Status   11/30/2021 30.0 26.5 - 33.0 pg Final     Platelet Count   Date Value Ref Range Status   11/30/2021 270 150 - 450 10e3/uL Final     ALT   Date Value Ref Range Status   11/30/2021 27 0 - 45 U/L Final     Albumin   Date Value Ref Range Status   11/30/2021 3.7 3.5 - 5.0 g/dL Final     Creatinine   Date Value Ref Range Status   11/30/2021 0.72 0.60 - 1.10 mg/dL Final     GFR Estimate   Date Value Ref Range Status   11/30/2021 >90 >60 mL/min/1.73m2 Final     Comment:     As of July 11, 2021, eGFR is calculated by the CKD-EPI creatinine equation, without race adjustment. eGFR can be influenced by muscle mass, exercise, and diet. The reported eGFR is an estimation only and is only applicable if the renal function is stable.   06/07/2021 >60 >60 mL/min/1.73m2 Final     GFR Estimate If Black   Date Value Ref Range Status   06/07/2021 >60 >60 mL/min/1.73m2 Final

## 2022-01-14 RX ORDER — DULOXETIN HYDROCHLORIDE 20 MG/1
CAPSULE, DELAYED RELEASE ORAL
Qty: 30 CAPSULE | Refills: 1 | Status: SHIPPED | OUTPATIENT
Start: 2022-01-14 | End: 2022-04-26

## 2022-02-15 ENCOUNTER — TELEPHONE (OUTPATIENT)
Dept: RHEUMATOLOGY | Facility: CLINIC | Age: 52
End: 2022-02-15
Payer: COMMERCIAL

## 2022-02-15 DIAGNOSIS — M06.4 INFLAMMATORY POLYARTHRITIS (H): ICD-10-CM

## 2022-02-15 DIAGNOSIS — M77.9 ENTHESITIS: ICD-10-CM

## 2022-02-15 DIAGNOSIS — L40.50 PSORIATIC ARTHRITIS (H): ICD-10-CM

## 2022-02-16 RX ORDER — METHOTREXATE 2.5 MG/1
TABLET ORAL
Qty: 120 TABLET | Refills: 0 | Status: SHIPPED | OUTPATIENT
Start: 2022-02-16 | End: 2022-05-11

## 2022-02-23 DIAGNOSIS — L40.50 PSORIATIC ARTHRITIS (H): ICD-10-CM

## 2022-02-23 DIAGNOSIS — M77.9 ENTHESITIS: ICD-10-CM

## 2022-02-23 RX ORDER — APREMILAST 30 MG/1
30 TABLET, FILM COATED ORAL 2 TIMES DAILY
Qty: 180 TABLET | Refills: 0 | Status: SHIPPED | OUTPATIENT
Start: 2022-02-23 | End: 2022-08-25

## 2022-02-23 NOTE — TELEPHONE ENCOUNTER
Refill request from [facility]    Medication: Otezla 30mg  Last Refill: 12/9/21  Last OV: 1/13/22  Last lab: 11/30/21  Future lab: 3/4/22  Future OV: 3/9/22

## 2022-03-04 ENCOUNTER — LAB (OUTPATIENT)
Dept: LAB | Facility: CLINIC | Age: 52
End: 2022-03-04
Payer: COMMERCIAL

## 2022-03-04 DIAGNOSIS — L40.50 PSORIATIC ARTHRITIS (H): ICD-10-CM

## 2022-03-04 DIAGNOSIS — M06.4 INFLAMMATORY POLYARTHRITIS (H): ICD-10-CM

## 2022-03-04 LAB
ALBUMIN SERPL-MCNC: 3.7 G/DL (ref 3.5–5)
ALT SERPL W P-5'-P-CCNC: 12 U/L (ref 0–45)
CREAT SERPL-MCNC: 0.74 MG/DL (ref 0.6–1.1)
ERYTHROCYTE [DISTWIDTH] IN BLOOD BY AUTOMATED COUNT: 14.6 % (ref 10–15)
GFR SERPL CREATININE-BSD FRML MDRD: >90 ML/MIN/1.73M2
HCT VFR BLD AUTO: 38.1 % (ref 35–47)
HGB BLD-MCNC: 12.6 G/DL (ref 11.7–15.7)
MCH RBC QN AUTO: 30.3 PG (ref 26.5–33)
MCHC RBC AUTO-ENTMCNC: 33.1 G/DL (ref 31.5–36.5)
MCV RBC AUTO: 92 FL (ref 78–100)
PLATELET # BLD AUTO: 309 10E3/UL (ref 150–450)
RBC # BLD AUTO: 4.16 10E6/UL (ref 3.8–5.2)
WBC # BLD AUTO: 6.8 10E3/UL (ref 4–11)

## 2022-03-04 PROCEDURE — 85027 COMPLETE CBC AUTOMATED: CPT

## 2022-03-04 PROCEDURE — 82565 ASSAY OF CREATININE: CPT

## 2022-03-04 PROCEDURE — 84460 ALANINE AMINO (ALT) (SGPT): CPT

## 2022-03-04 PROCEDURE — 36415 COLL VENOUS BLD VENIPUNCTURE: CPT

## 2022-03-04 PROCEDURE — 82040 ASSAY OF SERUM ALBUMIN: CPT

## 2022-03-09 ENCOUNTER — OFFICE VISIT (OUTPATIENT)
Dept: RHEUMATOLOGY | Facility: CLINIC | Age: 52
End: 2022-03-09
Payer: COMMERCIAL

## 2022-03-09 ENCOUNTER — LAB (OUTPATIENT)
Dept: LAB | Facility: CLINIC | Age: 52
End: 2022-03-09

## 2022-03-09 VITALS
SYSTOLIC BLOOD PRESSURE: 130 MMHG | HEART RATE: 60 BPM | BODY MASS INDEX: 27.1 KG/M2 | DIASTOLIC BLOOD PRESSURE: 88 MMHG | WEIGHT: 178.8 LBS | HEIGHT: 68 IN

## 2022-03-09 DIAGNOSIS — L40.50 PSORIATIC ARTHRITIS (H): Primary | ICD-10-CM

## 2022-03-09 DIAGNOSIS — Z79.899 HIGH RISK MEDICATION USE: ICD-10-CM

## 2022-03-09 DIAGNOSIS — L40.50 PSORIATIC ARTHRITIS (H): ICD-10-CM

## 2022-03-09 DIAGNOSIS — M77.9 ENTHESITIS: ICD-10-CM

## 2022-03-09 DIAGNOSIS — Z84.0 FAMILY HISTORY OF PSORIASIS IN SISTER: ICD-10-CM

## 2022-03-09 PROCEDURE — 87340 HEPATITIS B SURFACE AG IA: CPT

## 2022-03-09 PROCEDURE — 86481 TB AG RESPONSE T-CELL SUSP: CPT

## 2022-03-09 PROCEDURE — 86803 HEPATITIS C AB TEST: CPT

## 2022-03-09 PROCEDURE — 36415 COLL VENOUS BLD VENIPUNCTURE: CPT

## 2022-03-09 PROCEDURE — 99214 OFFICE O/P EST MOD 30 MIN: CPT | Performed by: INTERNAL MEDICINE

## 2022-03-09 NOTE — PROGRESS NOTES
Rheumatology follow-up visit note     Essence is a 51 year old female presents today for follow-up.    Essence was seen today for recheck.    Diagnoses and all orders for this visit:    Psoriatic arthritis (H)  -     Quantiferon-TB Gold Plus; Future  -     Hepatitis C antibody; Future  -     Hepatitis B surface antigen; Future  -     adalimumab (HUMIRA *CF*) 40 MG/0.4ML pen kit; Inject 0.4 mLs (40 mg) Subcutaneous every 14 days Hold for signs of infection, then seek medical attention.    Enthesitis  -     Quantiferon-TB Gold Plus; Future  -     Hepatitis C antibody; Future  -     Hepatitis B surface antigen; Future  -     adalimumab (HUMIRA *CF*) 40 MG/0.4ML pen kit; Inject 0.4 mLs (40 mg) Subcutaneous every 14 days Hold for signs of infection, then seek medical attention.    High risk medication use  -     Quantiferon-TB Gold Plus; Future  -     Hepatitis C antibody; Future  -     Hepatitis B surface antigen; Future    Family history of psoriasis in sister        This patient with psoriatic arthritis has not done lateral with the current combination of Otezla and methotrexate which had initially worked well for her.  She has marked the spectrum of psoriatic arthritis group of conditions predominantly enthesitis such as around her trochanteric, knee areas.  And inflammatory joint disease of the PIPs of the toes.  She does not have dactylitis.  Today we discussed how to manage this from this point onward.  I have asked her to consider [employer] literature on tumor necrosis factor inhibitors provided.  Start off with Humira check labs as noted.  We will meet here in 3 months.  In the interim I have asked her to continue methotrexate.    Follow up in 3 months.    HPI    Essence Rubio is a 51 year old female is here for follow-up of psoriatic arthritis, sisters history of psoriasis her own of enthesitis and inflammatory joint disease, on methotrexate, Otezla, folic acid.  Over the past month she has noted  "increasing pain.  This initially was responsive to over-the-counter measures now it is not.  This is reached a point where she is unable to have a good night sleep, napping really more so than sleeping.  On 2 occasions she tried prednisone and each time had thrush, within 2 days of starting prednisone.  She noticed some of the worst pains around her hips, knees and ankles.  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        DETAILED EXAMINATION  03/09/22  :    Vitals:    03/09/22 1446   BP: 130/88   BP Location: Right arm   Patient Position: Sitting   Cuff Size: Adult Regular   Pulse: 60   Weight: 81.1 kg (178 lb 12.8 oz)   Height: 1.715 m (5' 7.5\")     Alert oriented. Head including the face is examined for malar rash, heliotropes, scarring, lupus pernio. Eyes examined for redness such as in episcleritis/scleritis, periorbital lesions.   Neck/ Face examined for parotid gland swelling, range of motion of neck.  Left upper and lower and right upper and lower extremities examined for tenderness, swelling, warmth of the appendicular joints, range of motion, edema, rash.  Some of the important findings included: she does not have evidence of synovitis in the palpable joints of the upper extremities.  No significant deformities of the digits.  no Heberden nodes.  Range of motion of the shoulders  show full abduction.  No JLT effusion or warmth of the knees.  she does not have dactylitis of the digits.  She has marked tenderness in the trochanteric area, knee joint line, laterally, PIP joints of both toes #2 3 and 4 bilaterally.  There is no enthesitis of the Achilles.     Patient Active Problem List    Diagnosis Date Noted     Polymyalgia (H) 01/13/2022     Priority: Medium     1st MTP arthritis 05/20/2019     Priority: Medium     Enthesitis -knee 05/22/2018     Priority: Medium     " Psoriatic arthritis (H) 2018     Priority: Medium     Piriformis syndrome, unspecified laterality 10/17/2017     Priority: Medium     Family history of psoriasis in sister 2017     Priority: Medium     Acute bilateral low back pain with sciatica 2017     Priority: Medium     Trochanteric bursitis of both hips 2016     Priority: Medium     S/P vaginal hysterectomy 2016     Priority: Medium     High risk medication use 2016     Priority: Medium     Gallstone 09/10/2015     Priority: Medium     Past Surgical History:   Procedure Laterality Date     ABDOMEN SURGERY      lap for endometriosis     ANKLE ARTHROPLASTY      ankle surgery; pt states she has plates, no pins.      SECTION       HYSTERECTOMY  2016     LAPAROSCOPIC CHOLECYSTECTOMY N/A 2015    Procedure: CHOLECYSTECTOMY LAPAROSCOPIC;  Surgeon: Nithin PANG MD;  Location: [facility];  Service:      OOPHORECTOMY  2019     OTHER SURGICAL HISTORY  2016    Laparoscopic vaginal hysterectomy, bilateral salpingectomyfailed ablation / [facility] / [facility]      TONSILLECTOMY        Past Medical History:   Diagnosis Date     Asthma     aggravated with colds     History of anesthesia complications      PONV (postoperative nausea and vomiting)      Rheumatoid arthritis (H)      Allergies   Allergen Reactions     Penicillins Anaphylaxis     Penicillin V Potassium [Penicillin V] Other (See Comments)     Current Outpatient Medications   Medication Sig Dispense Refill     apremilast (OTEZLA) 30 MG tablet Take 1 tablet (30 mg) by mouth 2 times daily 180 tablet 0     aspirin 81 MG EC tablet [ASPIRIN 81 MG EC TABLET] Take 81 mg by mouth daily.       cetirizine (ZYRTEC) 10 MG tablet [CETIRIZINE (ZYRTEC) 10 MG TABLET] Take 10 mg by mouth daily.       DULoxetine (CYMBALTA) 20 MG capsule TAKE 1 CAPSULE(20 MG) BY MOUTH DAILY 30 capsule 1     ergocalciferol (VITAMIN D2) 50,000 unit capsule Take 2,000 Units by mouth daily         estradiol (ESTRACE) 2 MG tablet [ESTRADIOL (ESTRACE) 2 MG TABLET] Take 2 mg by mouth daily.              folic acid (FOLVITE) 1 MG tablet TAKE 1 TABLET DAILY 90 tablet 1     methotrexate sodium 2.5 MG TABS TAKE 10 TABLETS ONCE A WEEK 120 tablet 0     predniSONE (DELTASONE) 5 MG tablet Take 1.5 tablets (7.5 mg) by mouth daily 32 tablet 0     family history includes Cancer in her sister; Diabetes in her maternal grandmother and mother; Hyperlipidemia in her father; Hypertension in her father; Rheumatoid Arthritis in her mother.  Social Connections: Not on file          WBC Count   Date Value Ref Range Status   03/04/2022 6.8 4.0 - 11.0 10e3/uL Final     RBC Count   Date Value Ref Range Status   03/04/2022 4.16 3.80 - 5.20 10e6/uL Final     Hemoglobin   Date Value Ref Range Status   03/04/2022 12.6 11.7 - 15.7 g/dL Final     Hematocrit   Date Value Ref Range Status   03/04/2022 38.1 35.0 - 47.0 % Final     MCV   Date Value Ref Range Status   03/04/2022 92 78 - 100 fL Final     MCH   Date Value Ref Range Status   03/04/2022 30.3 26.5 - 33.0 pg Final     Platelet Count   Date Value Ref Range Status   03/04/2022 309 150 - 450 10e3/uL Final     ALT   Date Value Ref Range Status   03/04/2022 12 0 - 45 U/L Final     Albumin   Date Value Ref Range Status   03/04/2022 3.7 3.5 - 5.0 g/dL Final     Creatinine   Date Value Ref Range Status   03/04/2022 0.74 0.60 - 1.10 mg/dL Final     GFR Estimate   Date Value Ref Range Status   03/04/2022 >90 >60 mL/min/1.73m2 Final     Comment:     Effective December 21, 2021 eGFRcr in adults is calculated using the 2021 CKD-EPI creatinine equation which includes age and gender (Ian romero al., NEJM, DOI: 10.1056/EKSVls8025783)   06/07/2021 >60 >60 mL/min/1.73m2 Final     GFR Estimate If Black   Date Value Ref Range Status   06/07/2021 >60 >60 mL/min/1.73m2 Final

## 2022-03-10 ENCOUNTER — TELEPHONE (OUTPATIENT)
Dept: RHEUMATOLOGY | Facility: CLINIC | Age: 52
End: 2022-03-10
Payer: COMMERCIAL

## 2022-03-10 LAB
GAMMA INTERFERON BACKGROUND BLD IA-ACNC: 0.01 IU/ML
HBV SURFACE AG SERPL QL IA: NONREACTIVE
HCV AB SERPL QL IA: NEGATIVE
M TB IFN-G BLD-IMP: NEGATIVE
M TB IFN-G CD4+ BCKGRND COR BLD-ACNC: 9.99 IU/ML
MITOGEN IGNF BCKGRD COR BLD-ACNC: 0.03 IU/ML
MITOGEN IGNF BCKGRD COR BLD-ACNC: 0.04 IU/ML
QUANTIFERON MITOGEN: 10 IU/ML
QUANTIFERON NIL TUBE: 0.01 IU/ML
QUANTIFERON TB1 TUBE: 0.04 IU/ML
QUANTIFERON TB2 TUBE: 0.05

## 2022-03-10 NOTE — TELEPHONE ENCOUNTER
Prior Authorization Approval    Authorization Effective Date: 2/8/2022  Authorization Expiration Date: 9/6/2022  Medication: Humira Pen - Approved  Approved Dose/Quantity: 2ml/28 days  Reference #: [id number] Key: [id number] - [place] Case ID: [id number]   Insurance Company: [employer] - Phone [phone] Fax [phone]  Expected CoPay: Unknown     CoPay Card Available: Yes - notified via infibond   Foundation Assistance Needed: No  Which Pharmacy is filling the prescription (Not needed for infusion/clinic administered): [address]  Pharmacy Notified: No  Patient Notified: Yes - via [facility] Key: [id number]

## 2022-05-03 DIAGNOSIS — M06.4 INFLAMMATORY POLYARTHRITIS (H): ICD-10-CM

## 2022-05-04 RX ORDER — FOLIC ACID 1 MG/1
TABLET ORAL
Qty: 90 TABLET | Refills: 1 | Status: SHIPPED | OUTPATIENT
Start: 2022-05-04 | End: 2022-10-31

## 2022-05-10 DIAGNOSIS — M77.9 ENTHESITIS: ICD-10-CM

## 2022-05-10 DIAGNOSIS — M06.4 INFLAMMATORY POLYARTHRITIS (H): ICD-10-CM

## 2022-05-10 DIAGNOSIS — L40.50 PSORIATIC ARTHRITIS (H): ICD-10-CM

## 2022-05-11 RX ORDER — METHOTREXATE 2.5 MG/1
TABLET ORAL
Qty: 120 TABLET | Refills: 0 | Status: SHIPPED | OUTPATIENT
Start: 2022-05-11 | End: 2022-08-03

## 2022-06-14 ENCOUNTER — LAB (OUTPATIENT)
Dept: LAB | Facility: CLINIC | Age: 52
End: 2022-06-14
Payer: COMMERCIAL

## 2022-06-14 ENCOUNTER — OFFICE VISIT (OUTPATIENT)
Dept: RHEUMATOLOGY | Facility: CLINIC | Age: 52
End: 2022-06-14

## 2022-06-14 ENCOUNTER — HOSPITAL ENCOUNTER (OUTPATIENT)
Dept: GENERAL RADIOLOGY | Facility: HOSPITAL | Age: 52
Discharge: HOME | End: 2022-06-14
Attending: INTERNAL MEDICINE | Admitting: INTERNAL MEDICINE
Payer: COMMERCIAL

## 2022-06-14 VITALS
SYSTOLIC BLOOD PRESSURE: 120 MMHG | DIASTOLIC BLOOD PRESSURE: 64 MMHG | HEART RATE: 72 BPM | BODY MASS INDEX: 28.67 KG/M2 | WEIGHT: 185.8 LBS

## 2022-06-14 DIAGNOSIS — L40.50 PSORIATIC ARTHRITIS (H): ICD-10-CM

## 2022-06-14 DIAGNOSIS — M54.40 LOW BACK PAIN OF THORACOLUMBAR REGION WITH SCIATICA: ICD-10-CM

## 2022-06-14 DIAGNOSIS — L40.50 PSORIATIC ARTHRITIS (H): Primary | ICD-10-CM

## 2022-06-14 DIAGNOSIS — Z79.899 HIGH RISK MEDICATION USE: ICD-10-CM

## 2022-06-14 DIAGNOSIS — M06.4 INFLAMMATORY POLYARTHRITIS (H): ICD-10-CM

## 2022-06-14 DIAGNOSIS — M77.9 ENTHESITIS: ICD-10-CM

## 2022-06-14 DIAGNOSIS — M35.3 POLYMYALGIA (H): ICD-10-CM

## 2022-06-14 LAB
ALBUMIN SERPL-MCNC: 3.8 G/DL (ref 3.5–5)
ALT SERPL W P-5'-P-CCNC: 29 U/L (ref 0–45)
CREAT SERPL-MCNC: 0.7 MG/DL (ref 0.6–1.1)
ERYTHROCYTE [DISTWIDTH] IN BLOOD BY AUTOMATED COUNT: 14.6 % (ref 10–15)
GFR SERPL CREATININE-BSD FRML MDRD: >90 ML/MIN/1.73M2
HCT VFR BLD AUTO: 37.6 % (ref 35–47)
HGB BLD-MCNC: 12.6 G/DL (ref 11.7–15.7)
MCH RBC QN AUTO: 30.6 PG (ref 26.5–33)
MCHC RBC AUTO-ENTMCNC: 33.5 G/DL (ref 31.5–36.5)
MCV RBC AUTO: 91 FL (ref 78–100)
PLATELET # BLD AUTO: 276 10E3/UL (ref 150–450)
RBC # BLD AUTO: 4.12 10E6/UL (ref 3.8–5.2)
WBC # BLD AUTO: 4.8 10E3/UL (ref 4–11)

## 2022-06-14 PROCEDURE — 84460 ALANINE AMINO (ALT) (SGPT): CPT

## 2022-06-14 PROCEDURE — 82565 ASSAY OF CREATININE: CPT

## 2022-06-14 PROCEDURE — 72100 X-RAY EXAM L-S SPINE 2/3 VWS: CPT | Mod: FY

## 2022-06-14 PROCEDURE — 99214 OFFICE O/P EST MOD 30 MIN: CPT | Performed by: INTERNAL MEDICINE

## 2022-06-14 PROCEDURE — 82040 ASSAY OF SERUM ALBUMIN: CPT

## 2022-06-14 PROCEDURE — 36415 COLL VENOUS BLD VENIPUNCTURE: CPT

## 2022-06-14 PROCEDURE — 85027 COMPLETE CBC AUTOMATED: CPT

## 2022-06-14 NOTE — PROGRESS NOTES
"      Rheumatology follow-up visit note     Essence is a 51 year old female presents today for follow-up.    Essence was seen today for recheck.    Diagnoses and all orders for this visit:    Psoriatic arthritis (H)  -     XR Lumbar Spine 2-3 Views; Future    Enthesitis    Polymyalgia (H)    High risk medication use    Low back pain of thoracolumbar region with sciatica  -     XR Lumbar Spine 2-3 Views; Future        This patient with psoriatic arthritis, on methotrexate, recently started Humira off which she may have had 5 or 6 injections so far has not noticed any significant improvement in her pain in the lower extremities proximal and distal, she describes some features of pain which may be secondary to degenerative spondylitic changes rather than spondyloarthropathy.  Further work-up in that regard as noted.  Meanwhile I have asked her to stay on Humira for another couple of months.  She is on methotrexate to continue as now.    Follow up in 2 months.    HPI    Essence Rubio is a 51 year old female is here for follow-up of psoriatic arthritis, sisters history of psoriasis her own of enthesitis and inflammatory joint disease, on methotrexate, folic acid and Humira as noted.  Over the past month she has noted increasing pain.  This initially was responsive to over-the-counter measures now it is not.  This is reached a point where she is unable to have a good night sleep, napping really more so than sleeping.  On 2 occasions she tried prednisone and each time had thrush, within 2 days of starting prednisone.  Therefore she was started on Humira on the previous visit and mid March she started it, she has had 5 or 6 injections is not sure if it is on anything good for her at all.  She is no longer on Otezla.  She noted today that she has \"sciatica\" by which she means pain in her buttock region which radiates down her legs bilateral going down distal to the knees all the way to mid shin area.  This is in addition " and different to the trochanteric area pain.  She is also noticed discomfort in her ankles worse with activity.        DETAILED EXAMINATION  06/14/22  :    Vitals:    06/14/22 0934   BP: 120/64   Pulse: 72   Weight: 84.3 kg (185 lb 12.8 oz)     Alert oriented. Head including the face is examined for malar rash, heliotropes, scarring, lupus pernio. Eyes examined for redness such as in episcleritis/scleritis, periorbital lesions.   Neck/ Face examined for parotid gland swelling, range of motion of neck.  Left upper and lower and right upper and lower extremities examined for tenderness, swelling, warmth of the appendicular joints, range of motion, edema, rash.  Some of the important findings included: she does not have evidence of synovitis in the palpable joints of the upper extremities.  No significant deformities of the digits.  Small in the right index and middle Heberden nodes.  Range of motion of the shoulders  show full abduction, however associated with discomfort to the extremes of abduction.  No JLT effusion or warmth of the knees.  she does not have dactylitis of the digits or the toes.  She does not have tenderness at the Achilles insertion.  Neither she has this on the lateral of the medial epicondyles around the elbows..     Patient Active Problem List    Diagnosis Date Noted     Polymyalgia (H) 01/13/2022     Priority: Medium     1st MTP arthritis 05/20/2019     Priority: Medium     Enthesitis -knee 05/22/2018     Priority: Medium     Psoriatic arthritis (H) 05/22/2018     Priority: Medium     Piriformis syndrome, unspecified laterality 10/17/2017     Priority: Medium     Family history of psoriasis in sister 08/30/2017     Priority: Medium     Acute bilateral low back pain with sciatica 07/05/2017     Priority: Medium     Trochanteric bursitis of both hips 08/16/2016     Priority: Medium     S/P vaginal hysterectomy 07/13/2016     Priority: Medium     High risk medication use 04/19/2016     Priority:  Medium     Gallstone 09/10/2015     Priority: Medium     Past Surgical History:   Procedure Laterality Date     ABDOMEN SURGERY      lap for endometriosis     ANKLE ARTHROPLASTY      ankle surgery; pt states she has plates, no pins.      SECTION       HYSTERECTOMY  2016     LAPAROSCOPIC CHOLECYSTECTOMY N/A 2015    Procedure: CHOLECYSTECTOMY LAPAROSCOPIC;  Surgeon: Nithin PANG MD;  Location: [facility];  Service:      OOPHORECTOMY  2019     OTHER SURGICAL HISTORY  2016    Laparoscopic vaginal hysterectomy, bilateral salpingectomyfailed ablation / radha / [facility]      TONSILLECTOMY        Past Medical History:   Diagnosis Date     Asthma     aggravated with colds     History of anesthesia complications      PONV (postoperative nausea and vomiting)      Rheumatoid arthritis (H)      Allergies   Allergen Reactions     Penicillins Anaphylaxis     Penicillin V Potassium [Penicillin V] Other (See Comments)     Current Outpatient Medications   Medication Sig Dispense Refill     adalimumab (HUMIRA *CF*) 40 MG/0.4ML pen kit Inject 0.4 mLs (40 mg) Subcutaneous every 14 days Hold for signs of infection, then seek medical attention. 1 each 5     aspirin 81 MG EC tablet [ASPIRIN 81 MG EC TABLET] Take 81 mg by mouth daily.       cetirizine (ZYRTEC) 10 MG tablet [CETIRIZINE (ZYRTEC) 10 MG TABLET] Take 10 mg by mouth daily.       estradiol (ESTRACE) 2 MG tablet [ESTRADIOL (ESTRACE) 2 MG TABLET] Take 2 mg by mouth daily.              folic acid (FOLVITE) 1 MG tablet TAKE 1 TABLET DAILY 90 tablet 1     methotrexate sodium 2.5 MG TABS TAKE 10 TABLETS ONCE A WEEK 120 tablet 0     apremilast (OTEZLA) 30 MG tablet Take 1 tablet (30 mg) by mouth 2 times daily (Patient not taking: Reported on 2022) 180 tablet 0     DULoxetine (CYMBALTA) 20 MG capsule Take 1 capsule every other day for 2 weeks then stop (Patient not taking: Reported on 2022) 10 capsule 0     ergocalciferol (VITAMIN D2) 50,000  unit capsule Take 2,000 Units by mouth daily  (Patient not taking: Reported on 6/14/2022)       family history includes Cancer in her sister; Diabetes in her maternal grandmother and mother; Hyperlipidemia in her father; Hypertension in her father; Rheumatoid Arthritis in her mother.  Social Connections: Not on file          WBC Count   Date Value Ref Range Status   03/04/2022 6.8 4.0 - 11.0 10e3/uL Final     RBC Count   Date Value Ref Range Status   03/04/2022 4.16 3.80 - 5.20 10e6/uL Final     Hemoglobin   Date Value Ref Range Status   03/04/2022 12.6 11.7 - 15.7 g/dL Final     Hematocrit   Date Value Ref Range Status   03/04/2022 38.1 35.0 - 47.0 % Final     MCV   Date Value Ref Range Status   03/04/2022 92 78 - 100 fL Final     MCH   Date Value Ref Range Status   03/04/2022 30.3 26.5 - 33.0 pg Final     Platelet Count   Date Value Ref Range Status   03/04/2022 309 150 - 450 10e3/uL Final     ALT   Date Value Ref Range Status   03/04/2022 12 0 - 45 U/L Final     Albumin   Date Value Ref Range Status   03/04/2022 3.7 3.5 - 5.0 g/dL Final     Creatinine   Date Value Ref Range Status   03/04/2022 0.74 0.60 - 1.10 mg/dL Final     GFR Estimate   Date Value Ref Range Status   03/04/2022 >90 >60 mL/min/1.73m2 Final     Comment:     Effective December 21, 2021 eGFRcr in adults is calculated using the 2021 CKD-EPI creatinine equation which includes age and gender (Ian romero al., NEJM, DOI: 10.1056/PRIXwx0514949)   06/07/2021 >60 >60 mL/min/1.73m2 Final     GFR Estimate If Black   Date Value Ref Range Status   06/07/2021 >60 >60 mL/min/1.73m2 Final

## 2022-06-25 ENCOUNTER — MYC MEDICAL ADVICE (OUTPATIENT)
Dept: RHEUMATOLOGY | Facility: CLINIC | Age: 52
End: 2022-06-25

## 2022-06-25 DIAGNOSIS — L40.50 PSORIATIC ARTHRITIS (H): Primary | ICD-10-CM

## 2022-06-25 DIAGNOSIS — M54.40 LOW BACK PAIN OF THORACOLUMBAR REGION WITH SCIATICA: ICD-10-CM

## 2022-07-20 ENCOUNTER — HOSPITAL ENCOUNTER (OUTPATIENT)
Dept: MRI IMAGING | Facility: CLINIC | Age: 52
Discharge: HOME | End: 2022-07-20
Attending: INTERNAL MEDICINE
Payer: COMMERCIAL

## 2022-07-20 DIAGNOSIS — M54.40 LOW BACK PAIN OF THORACOLUMBAR REGION WITH SCIATICA: ICD-10-CM

## 2022-07-20 DIAGNOSIS — L40.50 PSORIATIC ARTHRITIS (H): ICD-10-CM

## 2022-07-20 PROCEDURE — 255N000002 HC RX 255 OP 636: Performed by: INTERNAL MEDICINE

## 2022-07-20 PROCEDURE — A9585 GADOBUTROL INJECTION: HCPCS | Performed by: INTERNAL MEDICINE

## 2022-07-20 PROCEDURE — 72197 MRI PELVIS W/O & W/DYE: CPT

## 2022-07-20 PROCEDURE — 72158 MRI LUMBAR SPINE W/O & W/DYE: CPT

## 2022-07-20 RX ORDER — GADOBUTROL 604.72 MG/ML
8 INJECTION INTRAVENOUS ONCE
Status: COMPLETED | OUTPATIENT
Start: 2022-07-20 | End: 2022-07-20

## 2022-07-20 RX ADMIN — GADOBUTROL 8 ML: 604.72 INJECTION INTRAVENOUS at 14:19

## 2022-07-30 DIAGNOSIS — L40.50 PSORIATIC ARTHRITIS (H): ICD-10-CM

## 2022-07-30 DIAGNOSIS — M77.9 ENTHESITIS: ICD-10-CM

## 2022-08-01 ENCOUNTER — HOSPITAL ENCOUNTER (OUTPATIENT)
Dept: MAMMOGRAPHY | Facility: CLINIC | Age: 52
Discharge: HOME | End: 2022-08-01
Attending: FAMILY MEDICINE | Admitting: FAMILY MEDICINE
Payer: COMMERCIAL

## 2022-08-01 DIAGNOSIS — Z12.31 VISIT FOR SCREENING MAMMOGRAM: ICD-10-CM

## 2022-08-01 PROCEDURE — 77067 SCR MAMMO BI INCL CAD: CPT

## 2022-08-01 RX ORDER — ADALIMUMAB 40MG/0.4ML
KIT SUBCUTANEOUS
Qty: 2 EACH | Refills: 3 | Status: SHIPPED | OUTPATIENT
Start: 2022-08-01 | End: 2023-02-06

## 2022-08-02 DIAGNOSIS — M06.4 INFLAMMATORY POLYARTHRITIS (H): ICD-10-CM

## 2022-08-02 DIAGNOSIS — L40.50 PSORIATIC ARTHRITIS (H): ICD-10-CM

## 2022-08-02 DIAGNOSIS — M77.9 ENTHESITIS: ICD-10-CM

## 2022-08-03 RX ORDER — METHOTREXATE 2.5 MG/1
TABLET ORAL
Qty: 120 TABLET | Refills: 0 | Status: SHIPPED | OUTPATIENT
Start: 2022-08-03 | End: 2022-08-25

## 2022-08-12 ENCOUNTER — HOSPITAL ENCOUNTER (OUTPATIENT)
Dept: PHYSICAL THERAPY | Facility: REHABILITATION | Age: 52
Discharge: HOME | End: 2022-08-12
Payer: COMMERCIAL

## 2022-08-12 DIAGNOSIS — M54.40 LOW BACK PAIN OF THORACOLUMBAR REGION WITH SCIATICA: ICD-10-CM

## 2022-08-12 PROCEDURE — 97161 PT EVAL LOW COMPLEX 20 MIN: CPT | Mod: GP | Performed by: PHYSICAL THERAPIST

## 2022-08-12 PROCEDURE — 97110 THERAPEUTIC EXERCISES: CPT | Mod: GP | Performed by: PHYSICAL THERAPIST

## 2022-08-15 ENCOUNTER — TELEPHONE (OUTPATIENT)
Dept: RHEUMATOLOGY | Facility: CLINIC | Age: 52
End: 2022-08-15

## 2022-08-15 NOTE — TELEPHONE ENCOUNTER
PA Initiation    Medication: Humira - PA Pending  Insurance Company: [employer] - Phone [phone] Fax [phone]  Pharmacy Filling the Rx: [facility] - [address] [place] [address]  Filling Pharmacy Phone:    Filling Pharmacy Fax:    Start Date: 8/15/2022    [facility] Key: [id number]

## 2022-08-17 NOTE — TELEPHONE ENCOUNTER
Prior Authorization Approval    Authorization Effective Date: 7/16/2022  Authorization Expiration Date: 2/11/2023  Medication: Humira - Approved  Approved Dose/Quantity: 2 syringes/month  Reference #: CMM Key: [id number]   Insurance Company: [employer] - Phone [phone] Fax [phone]  Expected CoPay: Unknown - [facility] not eligible     CoPay Card Available: Yes    Foundation Assistance Needed:    Which Pharmacy is filling the prescription (Not needed for infusion/clinic administered): [address]  Pharmacy Notified: No - renewal  Patient Notified: No - renewal

## 2022-08-23 ENCOUNTER — HOSPITAL ENCOUNTER (OUTPATIENT)
Dept: PHYSICAL THERAPY | Facility: REHABILITATION | Age: 52
Discharge: HOME | End: 2022-08-23
Payer: COMMERCIAL

## 2022-08-23 DIAGNOSIS — M54.40 LOW BACK PAIN OF THORACOLUMBAR REGION WITH SCIATICA: Primary | ICD-10-CM

## 2022-08-23 PROCEDURE — 97110 THERAPEUTIC EXERCISES: CPT | Mod: GP | Performed by: PHYSICAL THERAPIST

## 2022-08-25 ENCOUNTER — OFFICE VISIT (OUTPATIENT)
Dept: RHEUMATOLOGY | Facility: CLINIC | Age: 52
End: 2022-08-25

## 2022-08-25 ENCOUNTER — LAB (OUTPATIENT)
Dept: LAB | Facility: CLINIC | Age: 52
End: 2022-08-25
Payer: COMMERCIAL

## 2022-08-25 VITALS
HEIGHT: 68 IN | WEIGHT: 185 LBS | HEART RATE: 80 BPM | SYSTOLIC BLOOD PRESSURE: 116 MMHG | DIASTOLIC BLOOD PRESSURE: 78 MMHG | BODY MASS INDEX: 28.04 KG/M2

## 2022-08-25 DIAGNOSIS — Z79.899 HIGH RISK MEDICATION USE: ICD-10-CM

## 2022-08-25 DIAGNOSIS — L40.50 PSORIATIC ARTHRITIS (H): Primary | ICD-10-CM

## 2022-08-25 DIAGNOSIS — M77.9 ENTHESITIS: ICD-10-CM

## 2022-08-25 DIAGNOSIS — L40.50 PSORIATIC ARTHRITIS (H): ICD-10-CM

## 2022-08-25 LAB
ALBUMIN SERPL BCG-MCNC: 4.5 G/DL (ref 3.5–5.2)
ALT SERPL W P-5'-P-CCNC: 19 U/L (ref 10–35)
CREAT SERPL-MCNC: 0.68 MG/DL (ref 0.51–0.95)
ERYTHROCYTE [DISTWIDTH] IN BLOOD BY AUTOMATED COUNT: 14.3 % (ref 10–15)
GFR SERPL CREATININE-BSD FRML MDRD: >90 ML/MIN/1.73M2
HCT VFR BLD AUTO: 38.4 % (ref 35–47)
HGB BLD-MCNC: 12.9 G/DL (ref 11.7–15.7)
MCH RBC QN AUTO: 30.5 PG (ref 26.5–33)
MCHC RBC AUTO-ENTMCNC: 33.6 G/DL (ref 31.5–36.5)
MCV RBC AUTO: 91 FL (ref 78–100)
PLATELET # BLD AUTO: 313 10E3/UL (ref 150–450)
RBC # BLD AUTO: 4.23 10E6/UL (ref 3.8–5.2)
WBC # BLD AUTO: 6.4 10E3/UL (ref 4–11)

## 2022-08-25 PROCEDURE — 36415 COLL VENOUS BLD VENIPUNCTURE: CPT

## 2022-08-25 PROCEDURE — 82040 ASSAY OF SERUM ALBUMIN: CPT

## 2022-08-25 PROCEDURE — 84460 ALANINE AMINO (ALT) (SGPT): CPT

## 2022-08-25 PROCEDURE — 99214 OFFICE O/P EST MOD 30 MIN: CPT | Performed by: INTERNAL MEDICINE

## 2022-08-25 PROCEDURE — 82565 ASSAY OF CREATININE: CPT

## 2022-08-25 PROCEDURE — 85027 COMPLETE CBC AUTOMATED: CPT

## 2022-08-25 RX ORDER — METHOTREXATE 2.5 MG/1
TABLET ORAL
Qty: 120 TABLET | Refills: 0
Start: 2022-08-25 | End: 2022-10-26

## 2022-08-25 NOTE — PROGRESS NOTES
"      Rheumatology follow-up visit note     Essence is a 51 year old female presents today for follow-up.    Essence was seen today for recheck.    Diagnoses and all orders for this visit:    Psoriatic arthritis (H)  -     methotrexate sodium 2.5 MG TABS; TAKE 10 TABLETS ONCE A WEEK  -     Albumin level; Standing  -     ALT; Standing  -     Creatinine; Standing  -     CBC with platelets; Standing    Enthesitis  -     methotrexate sodium 2.5 MG TABS; TAKE 10 TABLETS ONCE A WEEK    High risk medication use  -     Albumin level; Standing  -     ALT; Standing  -     Creatinine; Standing  -     CBC with platelets; Standing        Psoriatic arthritis appears to be under good control with the combination of Humira and methotrexate that she has tolerated well.  Check labs today.  Hip area pain has begun to improve somewhat with physical therapy stretches and other exercises helping her sleep at night better.  We will meet here in 6 months labs every 3.    Follow up in 6 months.    HPI    Essence Rubio is a 51 year old female is here for follow-up.  She has psoriatic arthritis, in view of the sisters history of psoriasis, she herself has had inflammatory joint disease, enthesitis on methotrexate with folic acid and Humira.  She has had worsening pain keeping her up at night on the previous visit we discussed how the likelihood of the hip area pain is unlikely due to the psoriatic arthritis, findings such as piriformis syndrome is a differential was reviewed physical therapy was initiated that she is started recently and is already finding it is helpful.  She is no longer on Otezla.          DETAILED EXAMINATION  08/25/22  :    Vitals:    08/25/22 1219   BP: 116/78   BP Location: Right arm   Patient Position: Sitting   Cuff Size: Adult Regular   Pulse: 80   Weight: 83.9 kg (185 lb)   Height: 1.715 m (5' 7.5\")     Alert oriented. Head including the face is examined for malar rash, heliotropes, scarring, lupus pernio. Eyes " examined for redness such as in episcleritis/scleritis, periorbital lesions.   Neck/ Face examined for parotid gland swelling, range of motion of neck.  Left upper and lower and right upper and lower extremities examined for tenderness, swelling, warmth of the appendicular joints, range of motion, edema, rash.  Some of the important findings included: she does not have evidence of synovitis in the palpable joints of the upper extremities.  No significant deformities of the digits.  No Heberden nodes.  Range of motion of the shoulders  show full abduction.  No JLT effusion or warmth of the knees.  she does not have dactylitis of the digits.     Patient Active Problem List    Diagnosis Date Noted     Polymyalgia (H) 2022     Priority: Medium     1st MTP arthritis 2019     Priority: Medium     Enthesitis -knee 2018     Priority: Medium     Psoriatic arthritis (H) 2018     Priority: Medium     Piriformis syndrome, unspecified laterality 10/17/2017     Priority: Medium     Family history of psoriasis in sister 2017     Priority: Medium     Acute bilateral low back pain with sciatica 2017     Priority: Medium     Trochanteric bursitis of both hips 2016     Priority: Medium     S/P vaginal hysterectomy 2016     Priority: Medium     High risk medication use 2016     Priority: Medium     Gallstone 09/10/2015     Priority: Medium     Past Surgical History:   Procedure Laterality Date     ABDOMEN SURGERY      lap for endometriosis     ANKLE ARTHROPLASTY      ankle surgery; pt states she has plates, no pins.      SECTION       HYSTERECTOMY  2016     LAPAROSCOPIC CHOLECYSTECTOMY N/A 2015    Procedure: CHOLECYSTECTOMY LAPAROSCOPIC;  Surgeon: Nithin PANG MD;  Location: [facility];  Service:      OOPHORECTOMY  2019     OTHER SURGICAL HISTORY  2016    Laparoscopic vaginal hysterectomy, bilateral salpingectomyfailed ablation / [facility]       TONSILLECTOMY        Past Medical History:   Diagnosis Date     Asthma     aggravated with colds     History of anesthesia complications      PONV (postoperative nausea and vomiting)      Rheumatoid arthritis (H)      Allergies   Allergen Reactions     Penicillins Anaphylaxis     Penicillin V Potassium [Penicillin V] Other (See Comments)     Current Outpatient Medications   Medication Sig Dispense Refill     aspirin 81 MG EC tablet [ASPIRIN 81 MG EC TABLET] Take 81 mg by mouth daily.       cetirizine (ZYRTEC) 10 MG tablet [CETIRIZINE (ZYRTEC) 10 MG TABLET] Take 10 mg by mouth daily.       estradiol (ESTRACE) 2 MG tablet [ESTRADIOL (ESTRACE) 2 MG TABLET] Take 2 mg by mouth daily.              folic acid (FOLVITE) 1 MG tablet TAKE 1 TABLET DAILY 90 tablet 1     HUMIRA *CF* PEN 40 MG/0.4ML pen kit INJECT 0.4 ML (40 MG) UNDER THE SKIN EVERY 14 DAYS. HOLD FOR SIGNS OF INFECTION, THEN SEEK MEDICAL ATTENTION 2 each 3     methotrexate sodium 2.5 MG TABS TAKE 10 TABLETS ONCE A WEEK 120 tablet 0     apremilast (OTEZLA) 30 MG tablet Take 1 tablet (30 mg) by mouth 2 times daily (Patient not taking: No sig reported) 180 tablet 0     DULoxetine (CYMBALTA) 20 MG capsule Take 1 capsule every other day for 2 weeks then stop (Patient not taking: No sig reported) 10 capsule 0     ergocalciferol (VITAMIN D2) 50,000 unit capsule Take 2,000 Units by mouth daily  (Patient not taking: No sig reported)       family history includes Cancer in her sister; Diabetes in her maternal grandmother and mother; Hyperlipidemia in her father; Hypertension in her father; Rheumatoid Arthritis in her mother.  Social Connections: Not on file          WBC Count   Date Value Ref Range Status   06/14/2022 4.8 4.0 - 11.0 10e3/uL Final     RBC Count   Date Value Ref Range Status   06/14/2022 4.12 3.80 - 5.20 10e6/uL Final     Hemoglobin   Date Value Ref Range Status   06/14/2022 12.6 11.7 - 15.7 g/dL Final     Hematocrit   Date Value Ref Range Status    06/14/2022 37.6 35.0 - 47.0 % Final     MCV   Date Value Ref Range Status   06/14/2022 91 78 - 100 fL Final     MCH   Date Value Ref Range Status   06/14/2022 30.6 26.5 - 33.0 pg Final     Platelet Count   Date Value Ref Range Status   06/14/2022 276 150 - 450 10e3/uL Final     ALT   Date Value Ref Range Status   06/14/2022 29 0 - 45 U/L Final     Albumin   Date Value Ref Range Status   06/14/2022 3.8 3.5 - 5.0 g/dL Final     Creatinine   Date Value Ref Range Status   06/14/2022 0.70 0.60 - 1.10 mg/dL Final     GFR Estimate   Date Value Ref Range Status   06/14/2022 >90 >60 mL/min/1.73m2 Final     Comment:     Effective December 21, 2021 eGFRcr in adults is calculated using the 2021 CKD-EPI creatinine equation which includes age and gender (Ian romero al., NEJ, DOI: 10.1056/LDWOuj2343303)   06/07/2021 >60 >60 mL/min/1.73m2 Final     GFR Estimate If Black   Date Value Ref Range Status   06/07/2021 >60 >60 mL/min/1.73m2 Final

## 2022-08-26 ENCOUNTER — LAB REQUISITION (OUTPATIENT)
Dept: LAB | Facility: CLINIC | Age: 52
End: 2022-08-26

## 2022-08-26 DIAGNOSIS — H93.90 UNSPECIFIED DISORDER OF EAR, UNSPECIFIED EAR: ICD-10-CM

## 2022-08-26 PROCEDURE — 88305 TISSUE EXAM BY PATHOLOGIST: CPT | Performed by: PATHOLOGY

## 2022-08-31 LAB
PATH REPORT.COMMENTS IMP SPEC: NORMAL
PATH REPORT.FINAL DX SPEC: NORMAL
PATH REPORT.GROSS SPEC: NORMAL
PATH REPORT.MICROSCOPIC SPEC OTHER STN: NORMAL
PATH REPORT.RELEVANT HX SPEC: NORMAL
PHOTO IMAGE: NORMAL

## 2022-09-15 ENCOUNTER — HOSPITAL ENCOUNTER (OUTPATIENT)
Dept: PHYSICAL THERAPY | Facility: REHABILITATION | Age: 52
Discharge: HOME | End: 2022-09-15
Payer: COMMERCIAL

## 2022-09-15 DIAGNOSIS — M54.40 LOW BACK PAIN OF THORACOLUMBAR REGION WITH SCIATICA: Primary | ICD-10-CM

## 2022-09-15 PROCEDURE — 97110 THERAPEUTIC EXERCISES: CPT | Mod: GP | Performed by: PHYSICAL THERAPIST

## 2022-10-02 ENCOUNTER — HEALTH MAINTENANCE LETTER (OUTPATIENT)
Age: 52
End: 2022-10-02

## 2022-10-18 ENCOUNTER — HOSPITAL ENCOUNTER (OUTPATIENT)
Dept: PHYSICAL THERAPY | Facility: REHABILITATION | Age: 52
Discharge: HOME | End: 2022-10-18
Payer: COMMERCIAL

## 2022-10-18 DIAGNOSIS — M54.40 LOW BACK PAIN OF THORACOLUMBAR REGION WITH SCIATICA: Primary | ICD-10-CM

## 2022-10-18 PROCEDURE — 97110 THERAPEUTIC EXERCISES: CPT | Mod: GP | Performed by: PHYSICAL THERAPIST

## 2022-10-30 DIAGNOSIS — M06.4 INFLAMMATORY POLYARTHRITIS (H): ICD-10-CM

## 2022-10-31 RX ORDER — FOLIC ACID 1 MG/1
TABLET ORAL
Qty: 90 TABLET | Refills: 3 | Status: SHIPPED | OUTPATIENT
Start: 2022-10-31 | End: 2023-03-30

## 2022-11-04 ENCOUNTER — HOSPITAL ENCOUNTER (OUTPATIENT)
Dept: PHYSICAL THERAPY | Facility: REHABILITATION | Age: 52
Discharge: HOME | End: 2022-11-04
Payer: COMMERCIAL

## 2022-11-04 DIAGNOSIS — M54.40 LOW BACK PAIN OF THORACOLUMBAR REGION WITH SCIATICA: Primary | ICD-10-CM

## 2022-11-04 PROCEDURE — 97110 THERAPEUTIC EXERCISES: CPT | Mod: GP | Performed by: PHYSICAL THERAPIST

## 2022-11-21 ENCOUNTER — LAB (OUTPATIENT)
Dept: LAB | Facility: CLINIC | Age: 52
End: 2022-11-21
Payer: COMMERCIAL

## 2022-11-21 DIAGNOSIS — L40.50 PSORIATIC ARTHRITIS (H): ICD-10-CM

## 2022-11-21 DIAGNOSIS — Z79.899 HIGH RISK MEDICATION USE: ICD-10-CM

## 2022-11-21 LAB
ALBUMIN SERPL BCG-MCNC: 4.3 G/DL (ref 3.5–5.2)
ALT SERPL W P-5'-P-CCNC: 22 U/L (ref 10–35)
CREAT SERPL-MCNC: 0.67 MG/DL (ref 0.51–0.95)
ERYTHROCYTE [DISTWIDTH] IN BLOOD BY AUTOMATED COUNT: 14.2 % (ref 10–15)
GFR SERPL CREATININE-BSD FRML MDRD: >90 ML/MIN/1.73M2
HCT VFR BLD AUTO: 36.2 % (ref 35–47)
HGB BLD-MCNC: 12 G/DL (ref 11.7–15.7)
MCH RBC QN AUTO: 30.4 PG (ref 26.5–33)
MCHC RBC AUTO-ENTMCNC: 33.1 G/DL (ref 31.5–36.5)
MCV RBC AUTO: 92 FL (ref 78–100)
PLATELET # BLD AUTO: 277 10E3/UL (ref 150–450)
RBC # BLD AUTO: 3.95 10E6/UL (ref 3.8–5.2)
WBC # BLD AUTO: 5.6 10E3/UL (ref 4–11)

## 2022-11-21 PROCEDURE — 84460 ALANINE AMINO (ALT) (SGPT): CPT

## 2022-11-21 PROCEDURE — 36415 COLL VENOUS BLD VENIPUNCTURE: CPT

## 2022-11-21 PROCEDURE — 82040 ASSAY OF SERUM ALBUMIN: CPT

## 2022-11-21 PROCEDURE — 85027 COMPLETE CBC AUTOMATED: CPT

## 2022-11-21 PROCEDURE — 82565 ASSAY OF CREATININE: CPT

## 2022-12-27 ENCOUNTER — HOSPITAL ENCOUNTER (OUTPATIENT)
Dept: PHYSICAL THERAPY | Facility: REHABILITATION | Age: 52
Discharge: HOME | End: 2022-12-27
Payer: COMMERCIAL

## 2022-12-27 DIAGNOSIS — M54.40 LOW BACK PAIN OF THORACOLUMBAR REGION WITH SCIATICA: Primary | ICD-10-CM

## 2022-12-27 PROCEDURE — 97110 THERAPEUTIC EXERCISES: CPT | Mod: GP | Performed by: PHYSICAL THERAPIST

## 2022-12-27 NOTE — PROGRESS NOTES
[facility]    Outpatient Physical Therapy Discharge Note  Patient: Essence Rubio  : 1970    Beginning/End Dates of Reporting Period:  22 to 22    Referring Provider: Dr. Sher Ho    Therapy Diagnosis: decreased activity tolerance     Client Self Report: About 3 weeks ago she suddenly had much less pain.  She has been sleeping through the night. She was able to be busy and active over the holiday,.  75-80% overall improvement at this point.  She still has some pain and stiffness, but she feels like the exercises are helping with that again.  She is doing the exercises between 3x/week up to 2x/day depending on as needed basis for pain and stiffness.    Objective Measurements:  Objective Measure: Pain 4-5/10 today.     Objective Measure: JIM 24% today                 Goals:  Goal Identifier HEP   Goal Description Patient will be independent with HEP and self management of symptoms   Target Date 11/10/22   Date Met  22   Progress (detail required for progress note):       Goal Identifier walking   Goal Description Patient will ambulate 150 ft. for household distances without pain   Target Date 11/10/22   Date Met  22   Progress (detail required for progress note):       Goal Identifier standing   Goal Description Patient will stand for 30 minutes for food preparation without pain   Target Date 11/10/22   Date Met  22   Progress (detail required for progress note): able to stand for 1-2 hours without pain.         Plan:  Discharge from therapy.    Discharge:    Reason for Discharge: Patient has met all goals, and is ready to continue independently with HEP      Equipment Issued: none    Discharge Plan: Patient to continue home program.

## 2023-01-17 DIAGNOSIS — L40.50 PSORIATIC ARTHRITIS (H): ICD-10-CM

## 2023-01-17 DIAGNOSIS — M77.9 ENTHESITIS: ICD-10-CM

## 2023-01-18 RX ORDER — METHOTREXATE 2.5 MG/1
TABLET ORAL
Qty: 40 TABLET | Refills: 3 | Status: SHIPPED | OUTPATIENT
Start: 2023-01-18 | End: 2023-03-30

## 2023-01-30 ENCOUNTER — TELEPHONE (OUTPATIENT)
Dept: RHEUMATOLOGY | Facility: CLINIC | Age: 53
End: 2023-01-30

## 2023-01-30 NOTE — TELEPHONE ENCOUNTER
PA Initiation    Medication: Humira - PA Pending  Insurance Company: [employer] - Phone [phone] Fax [phone]  Pharmacy Filling the Rx: [facility] [address]  Filling Pharmacy Phone:    Filling Pharmacy Fax:    Start Date: 1/30/2023    Waiting on clinical questions in CoverMyMeds

## 2023-02-04 DIAGNOSIS — L40.50 PSORIATIC ARTHRITIS (H): ICD-10-CM

## 2023-02-04 DIAGNOSIS — M77.9 ENTHESITIS: ICD-10-CM

## 2023-02-06 RX ORDER — ADALIMUMAB 40MG/0.4ML
KIT SUBCUTANEOUS
Qty: 2 EACH | Refills: 0 | Status: SHIPPED | OUTPATIENT
Start: 2023-02-06 | End: 2023-03-30

## 2023-02-10 NOTE — TELEPHONE ENCOUNTER
Prior Authorization Approval    Authorization Effective Date: 1/11/2023  Authorization Expiration Date: 2/10/2024  Medication: Humira - PA approved   Approved Dose/Quantity:  Reference #: Key: [id number]   Insurance Company: [employer] - Phone [phone] Fax [phone]  Expected CoPay:  Unknown-- looked like they allowed a transition fill but would be [facility]     CoPay Card Available:      Foundation Assistance Needed:    Which Pharmacy is filling the prescription (Not needed for infusion/clinic administered): [address]  Pharmacy Notified:    Patient Notified: Yes  Writer obtained PA over phone     Approved 01/11/2023-02/10/2024    CASE [id number]    KARISSA Herrmann, [facility]  Specialty Pharmacy Clinic Liaison     [facility]    [email or web address]    Phone: [phone]  Fax: [phone]     Connect with [email or web address] on social media.

## 2023-02-22 ENCOUNTER — LAB (OUTPATIENT)
Dept: LAB | Facility: CLINIC | Age: 53
End: 2023-02-22
Payer: COMMERCIAL

## 2023-02-22 DIAGNOSIS — L40.50 PSORIATIC ARTHRITIS (H): ICD-10-CM

## 2023-02-22 DIAGNOSIS — Z79.899 HIGH RISK MEDICATION USE: ICD-10-CM

## 2023-02-22 LAB
ERYTHROCYTE [DISTWIDTH] IN BLOOD BY AUTOMATED COUNT: 14.1 % (ref 10–15)
HCT VFR BLD AUTO: 38.5 % (ref 35–47)
HGB BLD-MCNC: 12.9 G/DL (ref 11.7–15.7)
MCH RBC QN AUTO: 30.1 PG (ref 26.5–33)
MCHC RBC AUTO-ENTMCNC: 33.5 G/DL (ref 31.5–36.5)
MCV RBC AUTO: 90 FL (ref 78–100)
PLATELET # BLD AUTO: 313 10E3/UL (ref 150–450)
RBC # BLD AUTO: 4.28 10E6/UL (ref 3.8–5.2)
WBC # BLD AUTO: 6.3 10E3/UL (ref 4–11)

## 2023-02-22 PROCEDURE — 84460 ALANINE AMINO (ALT) (SGPT): CPT

## 2023-02-22 PROCEDURE — 36415 COLL VENOUS BLD VENIPUNCTURE: CPT

## 2023-02-22 PROCEDURE — 85027 COMPLETE CBC AUTOMATED: CPT

## 2023-02-22 PROCEDURE — 82565 ASSAY OF CREATININE: CPT

## 2023-02-22 PROCEDURE — 82040 ASSAY OF SERUM ALBUMIN: CPT

## 2023-02-23 LAB
ALBUMIN SERPL BCG-MCNC: 4.4 G/DL (ref 3.5–5.2)
ALT SERPL W P-5'-P-CCNC: 31 U/L (ref 10–35)
CREAT SERPL-MCNC: 0.64 MG/DL (ref 0.51–0.95)
GFR SERPL CREATININE-BSD FRML MDRD: >90 ML/MIN/1.73M2

## 2023-03-20 ENCOUNTER — LAB REQUISITION (OUTPATIENT)
Dept: LAB | Facility: CLINIC | Age: 53
End: 2023-03-20

## 2023-03-20 DIAGNOSIS — Z01.419 ENCOUNTER FOR GYNECOLOGICAL EXAMINATION (GENERAL) (ROUTINE) WITHOUT ABNORMAL FINDINGS: ICD-10-CM

## 2023-03-20 PROCEDURE — 87624 HPV HI-RISK TYP POOLED RSLT: CPT | Performed by: OBSTETRICS & GYNECOLOGY

## 2023-03-20 PROCEDURE — G0145 SCR C/V CYTO,THINLAYER,RESCR: HCPCS | Performed by: OBSTETRICS & GYNECOLOGY

## 2023-03-24 LAB
BKR LAB AP GYN ADEQUACY: NORMAL
BKR LAB AP GYN INTERPRETATION: NORMAL
BKR LAB AP HPV REFLEX: NORMAL
BKR LAB AP LMP: NORMAL
BKR LAB AP PREVIOUS ABNL DX: NORMAL
BKR LAB AP PREVIOUS ABNORMAL: NORMAL
PATH REPORT.COMMENTS IMP SPEC: NORMAL
PATH REPORT.COMMENTS IMP SPEC: NORMAL
PATH REPORT.RELEVANT HX SPEC: NORMAL

## 2023-03-28 LAB
HUMAN PAPILLOMA VIRUS 16 DNA: NEGATIVE
HUMAN PAPILLOMA VIRUS 18 DNA: NEGATIVE
HUMAN PAPILLOMA VIRUS FINAL DIAGNOSIS: NORMAL
HUMAN PAPILLOMA VIRUS OTHER HR: NEGATIVE

## 2023-03-30 ENCOUNTER — OFFICE VISIT (OUTPATIENT)
Dept: RHEUMATOLOGY | Facility: CLINIC | Age: 53
End: 2023-03-30
Payer: COMMERCIAL

## 2023-03-30 VITALS
HEART RATE: 84 BPM | WEIGHT: 191.4 LBS | DIASTOLIC BLOOD PRESSURE: 80 MMHG | BODY MASS INDEX: 29.54 KG/M2 | SYSTOLIC BLOOD PRESSURE: 130 MMHG

## 2023-03-30 DIAGNOSIS — M06.4 INFLAMMATORY POLYARTHRITIS (H): ICD-10-CM

## 2023-03-30 DIAGNOSIS — M35.3 POLYMYALGIA (H): Primary | ICD-10-CM

## 2023-03-30 DIAGNOSIS — L40.50 PSORIATIC ARTHRITIS (H): ICD-10-CM

## 2023-03-30 DIAGNOSIS — M77.9 ENTHESITIS: ICD-10-CM

## 2023-03-30 PROCEDURE — 99214 OFFICE O/P EST MOD 30 MIN: CPT | Performed by: INTERNAL MEDICINE

## 2023-03-30 RX ORDER — FOLIC ACID 1 MG/1
1000 TABLET ORAL DAILY
Qty: 90 TABLET | Refills: 3 | Status: SHIPPED | OUTPATIENT
Start: 2023-03-30 | End: 2023-10-26

## 2023-03-30 RX ORDER — METHOTREXATE 2.5 MG/1
25 TABLET ORAL
Qty: 120 TABLET | Refills: 0 | Status: SHIPPED | OUTPATIENT
Start: 2023-04-10 | End: 2023-03-30

## 2023-03-30 RX ORDER — ADALIMUMAB 40MG/0.4ML
KIT SUBCUTANEOUS
Qty: 2 EACH | Refills: 5 | Status: SHIPPED | OUTPATIENT
Start: 2023-03-30 | End: 2023-10-12

## 2023-03-30 RX ORDER — DULOXETIN HYDROCHLORIDE 30 MG/1
30 CAPSULE, DELAYED RELEASE ORAL DAILY
Qty: 30 CAPSULE | Refills: 2 | Status: SHIPPED | OUTPATIENT
Start: 2023-03-30 | End: 2023-07-10

## 2023-03-30 RX ORDER — METHOTREXATE 2.5 MG/1
25 TABLET ORAL
Qty: 120 TABLET | Refills: 0 | Status: SHIPPED | OUTPATIENT
Start: 2023-04-10 | End: 2023-06-13

## 2023-03-30 NOTE — PROGRESS NOTES
Rheumatology follow-up visit note     Essence is a 52 year old female presents today for follow-up.    Essence was seen today for recheck.    Diagnoses and all orders for this visit:    Polymyalgia (H)  -     DULoxetine (CYMBALTA) 30 MG capsule; Take 1 capsule (30 mg) by mouth daily for 30 days    Psoriatic arthritis (H)  -     Discontinue: methotrexate sodium 2.5 MG TABS; Take 10 tablets (25 mg) by mouth every 7 days for 90 days  -     adalimumab (HUMIRA *CF* PEN) 40 MG/0.4ML pen kit; INJECT 0.4 ML (40 MG) UNDER THE SKIN EVERY 14 DAYS. HOLD FOR SIGNS OF INFECTION, THEN SEEK MEDICAL ATTENTION  -     methotrexate sodium 2.5 MG TABS; Take 10 tablets (25 mg) by mouth every 7 days    Enthesitis  -     Discontinue: methotrexate sodium 2.5 MG TABS; Take 10 tablets (25 mg) by mouth every 7 days for 90 days  -     adalimumab (HUMIRA *CF* PEN) 40 MG/0.4ML pen kit; INJECT 0.4 ML (40 MG) UNDER THE SKIN EVERY 14 DAYS. HOLD FOR SIGNS OF INFECTION, THEN SEEK MEDICAL ATTENTION  -     methotrexate sodium 2.5 MG TABS; Take 10 tablets (25 mg) by mouth every 7 days  -     DULoxetine (CYMBALTA) 30 MG capsule; Take 1 capsule (30 mg) by mouth daily for 30 days    Inflammatory polyarthritis (H)  -     folic acid (FOLVITE) 1 MG tablet; Take 1 tablet (1,000 mcg) by mouth daily        This patient psoriatic arthritis, enthesitis has been a good control with current combination of methotrexate and Humira however she continues to be troubled by pain around her hip, buttock area, with features suggestive of piriformis syndrome/trochanteric bursitis without structural abnormality as detected on various evaluations including physical, MRI of the region.  Physical therapy has helped partially.  Corticosteroid injections provide help but only transient as would be expected.  That option could be duloxetine this is discussed she would like to try.    Follow up in 6 months.    HPI    Essence Rubio is a 52 year old female is here for follow-up  ofpsoriatic arthritis, in view of the sisters history of psoriasis, she herself has had inflammatory joint disease, enthesitis on methotrexate with folic acid and Humira.  She has had worsening pain keeping her up at night on the previous visit we discussed how the likelihood of the hip area pain is unlikely due to the psoriatic arthritis, findings such as piriformis syndrome is a differential was reviewed physical therapy was initiated that she is started recently and is already finding it is helpful.  She is no longer on Otezla.  Having had physical therapy there was some improvement.  She is continue to do stretching exercises at home.  However she has residual pain.  She is prepared to try something additional we talked about duloxetine.          DETAILED EXAMINATION  03/30/23  :    Vitals:    03/30/23 0932   BP: 130/80   Pulse: 84   Weight: 86.8 kg (191 lb 6.4 oz)     Alert oriented. Head including the face is examined for malar rash, heliotropes, scarring, lupus pernio. Eyes examined for redness such as in episcleritis/scleritis, periorbital lesions.   Neck/ Face examined for parotid gland swelling, range of motion of neck.  Left upper and lower and right upper and lower extremities examined for tenderness, swelling, warmth of the appendicular joints, range of motion, edema, rash.  Some of the important findings included: she does not have evidence of synovitis in the palpable joints of the upper extremities.  No significant deformities of the digits.  no Heberden nodes.  Range of motion of the shoulders  show full abduction.  No JLT effusion or warmth of the knees.  she does not have dactylitis of the digits.  She has tenderness in the trochanteric area, piriformis stretch is positive on the right side.  She has intact range of motion of the hip joints.  There is no tenderness in the trapezius, paraspinal region on both sides all the way down to the lumbosacral region.     Patient Active Problem List     Diagnosis Date Noted     Polymyalgia (H) 2022     Priority: Medium     1st MTP arthritis 2019     Priority: Medium     Enthesitis -knee 2018     Priority: Medium     Psoriatic arthritis (H) 2018     Priority: Medium     Piriformis syndrome, unspecified laterality 10/17/2017     Priority: Medium     Family history of psoriasis in sister 2017     Priority: Medium     Acute bilateral low back pain with sciatica 2017     Priority: Medium     Trochanteric bursitis of both hips 2016     Priority: Medium     S/P vaginal hysterectomy 2016     Priority: Medium     High risk medication use 2016     Priority: Medium     Gallstone 09/10/2015     Priority: Medium     Past Surgical History:   Procedure Laterality Date     ABDOMEN SURGERY      lap for endometriosis     ANKLE ARTHROPLASTY      ankle surgery; pt states she has plates, no pins.      SECTION       HYSTERECTOMY       LAPAROSCOPIC CHOLECYSTECTOMY N/A 2015    Procedure: CHOLECYSTECTOMY LAPAROSCOPIC;  Surgeon: Nithin PANG MD;  Location: [facility];  Service:      OOPHORECTOMY  2019     OTHER SURGICAL HISTORY  2016    Laparoscopic vaginal hysterectomy, bilateral salpingectomyfailed ablation / [facility]      TONSILLECTOMY        Past Medical History:   Diagnosis Date     Asthma     aggravated with colds     History of anesthesia complications      PONV (postoperative nausea and vomiting)      Rheumatoid arthritis (H)      Allergies   Allergen Reactions     Penicillins Anaphylaxis     Penicillin V Potassium [Penicillin V] Other (See Comments)     Current Outpatient Medications   Medication Sig Dispense Refill     aspirin 81 MG EC tablet [ASPIRIN 81 MG EC TABLET] Take 81 mg by mouth daily.       cetirizine (ZYRTEC) 10 MG tablet [CETIRIZINE (ZYRTEC) 10 MG TABLET] Take 10 mg by mouth daily.       ergocalciferol (VITAMIN D2) 50,000 unit capsule Take 2,000 Units by mouth daily        estradiol (ESTRACE) 2 MG tablet [ESTRADIOL (ESTRACE) 2 MG TABLET] Take 2 mg by mouth daily.              folic acid (FOLVITE) 1 MG tablet TAKE 1 TABLET DAILY 90 tablet 3     HUMIRA *CF* PEN 40 MG/0.4ML pen kit INJECT 0.4 ML (40 MG) UNDER THE SKIN EVERY 14 DAYS. HOLD FOR SIGNS OF INFECTION, THEN SEEK MEDICAL ATTENTION 2 each 0     methotrexate sodium 2.5 MG TABS TAKE 10 TABLETS ONCE A WEEK 40 tablet 3     family history includes Cancer in her sister; Diabetes in her maternal grandmother and mother; Hyperlipidemia in her father; Hypertension in her father; Rheumatoid Arthritis in her mother.  Social Connections: Not on file          WBC Count   Date Value Ref Range Status   02/22/2023 6.3 4.0 - 11.0 10e3/uL Final     RBC Count   Date Value Ref Range Status   02/22/2023 4.28 3.80 - 5.20 10e6/uL Final     Hemoglobin   Date Value Ref Range Status   02/22/2023 12.9 11.7 - 15.7 g/dL Final     Hematocrit   Date Value Ref Range Status   02/22/2023 38.5 35.0 - 47.0 % Final     MCV   Date Value Ref Range Status   02/22/2023 90 78 - 100 fL Final     MCH   Date Value Ref Range Status   02/22/2023 30.1 26.5 - 33.0 pg Final     Platelet Count   Date Value Ref Range Status   02/22/2023 313 150 - 450 10e3/uL Final     ALT   Date Value Ref Range Status   02/22/2023 31 10 - 35 U/L Final     Albumin   Date Value Ref Range Status   02/22/2023 4.4 3.5 - 5.2 g/dL Final   06/14/2022 3.8 3.5 - 5.0 g/dL Final     Creatinine   Date Value Ref Range Status   02/22/2023 0.64 0.51 - 0.95 mg/dL Final     GFR Estimate   Date Value Ref Range Status   02/22/2023 >90 >60 mL/min/1.73m2 Final     Comment:     eGFR calculated using 2021 CKD-EPI equation.   06/07/2021 >60 >60 mL/min/1.73m2 Final     GFR Estimate If Black   Date Value Ref Range Status   06/07/2021 >60 >60 mL/min/1.73m2 Final

## 2023-04-17 ENCOUNTER — TELEPHONE (OUTPATIENT)
Dept: RHEUMATOLOGY | Facility: CLINIC | Age: 53
End: 2023-04-17
Payer: COMMERCIAL

## 2023-04-17 NOTE — TELEPHONE ENCOUNTER
PA Initiation    Medication: Humira   Insurance Company: [employer] - Phone [phone] Fax [phone]  Pharmacy Filling the Rx: [address]  Filling Pharmacy Phone:    Filling Pharmacy Fax:    Start Date: 4/17/2023    FAXED Q  Set to plan via fax # [phone]  CASE [id number]    KARISSA Herrmann, [facility]  Specialty Pharmacy Clinic Liaison     [facility]    [email or web address]     Phone: [phone]  Fax: [phone]

## 2023-04-18 NOTE — TELEPHONE ENCOUNTER
Prior Authorization Approval    Authorization Effective Date: 3/18/2023  Authorization Expiration Date: 4/16/2024  Medication: Humira   Approved Dose/Quantity: humira 40  Reference #:   [id number]  Insurance Company: [employer] - Phone [phone] Fax [phone]  Expected CoPay:       CoPay Card Available:      Foundation Assistance Needed:    Which Pharmacy is filling the prescription (Not needed for infusion/clinic administered): [address]  Pharmacy Notified: Yes  Patient Notified: Yes      KARISSA Herrmann, [facility]  Specialty Pharmacy Clinic Liaison     [facility] Special[facility]    [email or web address]     Phone: [phone]  Fax: [phone]

## 2023-06-04 DIAGNOSIS — M77.9 ENTHESITIS: ICD-10-CM

## 2023-06-04 DIAGNOSIS — L40.50 PSORIATIC ARTHRITIS (H): ICD-10-CM

## 2023-06-12 ENCOUNTER — LAB (OUTPATIENT)
Dept: LAB | Facility: CLINIC | Age: 53
End: 2023-06-12
Payer: COMMERCIAL

## 2023-06-12 DIAGNOSIS — Z79.899 HIGH RISK MEDICATION USE: ICD-10-CM

## 2023-06-12 DIAGNOSIS — L40.50 PSORIATIC ARTHRITIS (H): ICD-10-CM

## 2023-06-12 LAB
ALBUMIN SERPL BCG-MCNC: 4.4 G/DL (ref 3.5–5.2)
ALT SERPL W P-5'-P-CCNC: 34 U/L (ref 10–35)
CREAT SERPL-MCNC: 0.68 MG/DL (ref 0.51–0.95)
ERYTHROCYTE [DISTWIDTH] IN BLOOD BY AUTOMATED COUNT: 14.3 % (ref 10–15)
GFR SERPL CREATININE-BSD FRML MDRD: >90 ML/MIN/1.73M2
HCT VFR BLD AUTO: 37.3 % (ref 35–47)
HGB BLD-MCNC: 12.8 G/DL (ref 11.7–15.7)
MCH RBC QN AUTO: 30.7 PG (ref 26.5–33)
MCHC RBC AUTO-ENTMCNC: 34.3 G/DL (ref 31.5–36.5)
MCV RBC AUTO: 89 FL (ref 78–100)
PLATELET # BLD AUTO: 297 10E3/UL (ref 150–450)
RBC # BLD AUTO: 4.17 10E6/UL (ref 3.8–5.2)
WBC # BLD AUTO: 5.9 10E3/UL (ref 4–11)

## 2023-06-12 PROCEDURE — 82565 ASSAY OF CREATININE: CPT

## 2023-06-12 PROCEDURE — 82040 ASSAY OF SERUM ALBUMIN: CPT

## 2023-06-12 PROCEDURE — 36415 COLL VENOUS BLD VENIPUNCTURE: CPT

## 2023-06-12 PROCEDURE — 85027 COMPLETE CBC AUTOMATED: CPT

## 2023-06-12 PROCEDURE — 84460 ALANINE AMINO (ALT) (SGPT): CPT

## 2023-07-08 DIAGNOSIS — M77.9 ENTHESITIS: ICD-10-CM

## 2023-07-08 DIAGNOSIS — M35.3 POLYMYALGIA (H): ICD-10-CM

## 2023-07-10 RX ORDER — DULOXETIN HYDROCHLORIDE 30 MG/1
CAPSULE, DELAYED RELEASE ORAL
Qty: 90 CAPSULE | Refills: 0 | Status: SHIPPED | OUTPATIENT
Start: 2023-07-10 | End: 2023-09-20

## 2023-08-10 ENCOUNTER — HOSPITAL ENCOUNTER (OUTPATIENT)
Dept: MAMMOGRAPHY | Facility: CLINIC | Age: 53
Discharge: HOME | End: 2023-08-10
Attending: STUDENT IN AN ORGANIZED HEALTH CARE EDUCATION/TRAINING PROGRAM | Admitting: STUDENT IN AN ORGANIZED HEALTH CARE EDUCATION/TRAINING PROGRAM
Payer: COMMERCIAL

## 2023-08-10 DIAGNOSIS — Z12.31 VISIT FOR SCREENING MAMMOGRAM: ICD-10-CM

## 2023-08-10 PROCEDURE — 77067 SCR MAMMO BI INCL CAD: CPT

## 2023-09-07 NOTE — TELEPHONE ENCOUNTER
Per Dr Ho- pt should have MRI of L spine and SI joints to further evaluate symptoms. Order entered, [employer] message sent to pt.   
No

## 2023-09-12 DIAGNOSIS — M77.9 ENTHESITIS: ICD-10-CM

## 2023-09-12 DIAGNOSIS — L40.50 PSORIATIC ARTHRITIS (H): ICD-10-CM

## 2023-09-12 NOTE — TELEPHONE ENCOUNTER
Response received from pt via [employer] indicating that she now has a lab appt scheduled 9/15/23, and that she will call to schedule her appt with Dr. Ho soon.

## 2023-09-13 NOTE — TELEPHONE ENCOUNTER
Message received from pt via [email or web address] indicating that she now also has an appt scheduled with Dr. Ho for 10/12/23.   She understands that once her lab tests have been completed on 9/15/23, then her medication can be refilled.

## 2023-09-15 ENCOUNTER — LAB (OUTPATIENT)
Dept: LAB | Facility: CLINIC | Age: 53
End: 2023-09-15
Payer: COMMERCIAL

## 2023-09-15 DIAGNOSIS — L40.50 PSORIATIC ARTHRITIS (H): ICD-10-CM

## 2023-09-15 LAB
ALBUMIN SERPL BCG-MCNC: 4.2 G/DL (ref 3.5–5.2)
ALT SERPL W P-5'-P-CCNC: 16 U/L (ref 0–50)
CREAT SERPL-MCNC: 0.67 MG/DL (ref 0.51–0.95)
EGFRCR SERPLBLD CKD-EPI 2021: >90 ML/MIN/1.73M2
ERYTHROCYTE [DISTWIDTH] IN BLOOD BY AUTOMATED COUNT: 13.9 % (ref 10–15)
HCT VFR BLD AUTO: 36.8 % (ref 35–47)
HGB BLD-MCNC: 12.2 G/DL (ref 11.7–15.7)
MCH RBC QN AUTO: 30 PG (ref 26.5–33)
MCHC RBC AUTO-ENTMCNC: 33.2 G/DL (ref 31.5–36.5)
MCV RBC AUTO: 90 FL (ref 78–100)
PLATELET # BLD AUTO: 293 10E3/UL (ref 150–450)
RBC # BLD AUTO: 4.07 10E6/UL (ref 3.8–5.2)
WBC # BLD AUTO: 6.3 10E3/UL (ref 4–11)

## 2023-09-15 PROCEDURE — 82040 ASSAY OF SERUM ALBUMIN: CPT

## 2023-09-15 PROCEDURE — 84460 ALANINE AMINO (ALT) (SGPT): CPT

## 2023-09-15 PROCEDURE — 36415 COLL VENOUS BLD VENIPUNCTURE: CPT

## 2023-09-15 PROCEDURE — 82565 ASSAY OF CREATININE: CPT

## 2023-09-15 PROCEDURE — 85027 COMPLETE CBC AUTOMATED: CPT

## 2023-09-19 DIAGNOSIS — M35.3 POLYMYALGIA (H): ICD-10-CM

## 2023-09-19 DIAGNOSIS — M77.9 ENTHESITIS: ICD-10-CM

## 2023-09-20 RX ORDER — DULOXETIN HYDROCHLORIDE 30 MG/1
CAPSULE, DELAYED RELEASE ORAL
Qty: 90 CAPSULE | Refills: 0 | Status: SHIPPED | OUTPATIENT
Start: 2023-09-20 | End: 2023-10-12

## 2023-10-03 DIAGNOSIS — L40.50 PSORIATIC ARTHRITIS (H): ICD-10-CM

## 2023-10-03 DIAGNOSIS — M77.9 ENTHESITIS: ICD-10-CM

## 2023-10-12 ENCOUNTER — OFFICE VISIT (OUTPATIENT)
Dept: RHEUMATOLOGY | Facility: CLINIC | Age: 53
End: 2023-10-12
Payer: COMMERCIAL

## 2023-10-12 VITALS
HEART RATE: 78 BPM | SYSTOLIC BLOOD PRESSURE: 120 MMHG | DIASTOLIC BLOOD PRESSURE: 78 MMHG | BODY MASS INDEX: 29.77 KG/M2 | WEIGHT: 192.9 LBS | OXYGEN SATURATION: 97 %

## 2023-10-12 DIAGNOSIS — M06.4 INFLAMMATORY POLYARTHRITIS (H): ICD-10-CM

## 2023-10-12 DIAGNOSIS — M35.3 POLYMYALGIA (H): ICD-10-CM

## 2023-10-12 DIAGNOSIS — M77.9 ENTHESITIS: ICD-10-CM

## 2023-10-12 DIAGNOSIS — L40.50 PSORIATIC ARTHRITIS (H): Primary | ICD-10-CM

## 2023-10-12 DIAGNOSIS — Z79.899 HIGH RISK MEDICATION USE: ICD-10-CM

## 2023-10-12 PROCEDURE — 99214 OFFICE O/P EST MOD 30 MIN: CPT | Performed by: INTERNAL MEDICINE

## 2023-10-12 RX ORDER — DULOXETIN HYDROCHLORIDE 30 MG/1
30 CAPSULE, DELAYED RELEASE ORAL DAILY
Qty: 90 CAPSULE | Refills: 1 | Status: SHIPPED | OUTPATIENT
Start: 2023-10-12 | End: 2024-03-26

## 2023-10-12 RX ORDER — ADALIMUMAB 40MG/0.4ML
KIT SUBCUTANEOUS
Qty: 2 EACH | Refills: 5 | Status: SHIPPED | OUTPATIENT
Start: 2023-10-12 | End: 2024-05-17

## 2023-10-12 NOTE — PROGRESS NOTES
Rheumatology follow-up visit note     Essence is a 53 year old female presents today for follow-up.    Essence was seen today for recheck.    Diagnoses and all orders for this visit:    Psoriatic arthritis (H)  -     adalimumab (HUMIRA *CF* PEN) 40 MG/0.4ML pen kit; INJECT 0.4 ML (40 MG) UNDER THE SKIN EVERY 14 DAYS. HOLD FOR SIGNS OF INFECTION, THEN SEEK MEDICAL ATTENTION    Enthesitis  -     adalimumab (HUMIRA *CF* PEN) 40 MG/0.4ML pen kit; INJECT 0.4 ML (40 MG) UNDER THE SKIN EVERY 14 DAYS. HOLD FOR SIGNS OF INFECTION, THEN SEEK MEDICAL ATTENTION  -     DULoxetine (CYMBALTA) 30 MG capsule; Take 1 capsule (30 mg) by mouth daily    Inflammatory polyarthritis (H)    High risk medication use    Polymyalgia (H24)  -     DULoxetine (CYMBALTA) 30 MG capsule; Take 1 capsule (30 mg) by mouth daily        This patient with psoriatic arthritis doing great with current combination.  She is tolerating methotrexate Humira combination nicely.  She is also on duloxetine for noninflammatory pain.  She is doing so much better.  She will stay the course.  We will meet here in 6 months.  Recent labs are normal.  We talked about vaccination.    Follow up in 6 months.    HPI    Essence Rubio is a 53 year old female is here for follow-up of  psoriatic arthritis, in view of the sisters history of psoriasis, she herself has had inflammatory joint disease, enthesitis on methotrexate with folic acid and Humira.  On her previous visit she was started on duloxetine in addition.  She feels so much better overall.  Her trochanteric area pain is improved.  She noted overall pain level to be 1.0 occasional discomfort in her DIPs, PIPs.  There is no sustained stiffness beyond the 20 minutes.  She is able to do all her day-to-day activities without difficulty.  She had labs drawn recently which are within acceptable range.       DETAILED EXAMINATION  10/12/23  :    Vitals:    10/12/23 1349   BP: 120/78   Pulse: 78   SpO2: 97%   Weight: 87.5  kg (192 lb 14.4 oz)     Alert oriented. Head including the face is examined for malar rash, heliotropes, scarring, lupus pernio. Eyes examined for redness such as in episcleritis/scleritis, periorbital lesions.   Neck/ Face examined for parotid gland swelling, range of motion of neck.  Left upper and lower and right upper and lower extremities examined for tenderness, swelling, warmth of the appendicular joints, range of motion, edema, rash.  Some of the important findings included: she does not have evidence of synovitis in the palpable joints of the upper extremities.  No significant deformities of the digits.  Minimal Heberden nodes.  Range of motion of the shoulders   show full abduction.  No JLT effusion or warmth of the knees.  she does not have dactylitis of the digits.     Patient Active Problem List    Diagnosis Date Noted    Polymyalgia (H24) 2022     Priority: Medium    1st MTP arthritis 2019     Priority: Medium    Enthesitis -knee 2018     Priority: Medium    Psoriatic arthritis (H) 2018     Priority: Medium    Piriformis syndrome, unspecified laterality 10/17/2017     Priority: Medium    Family history of psoriasis in sister 2017     Priority: Medium    Acute bilateral low back pain with sciatica 2017     Priority: Medium    Trochanteric bursitis of both hips 2016     Priority: Medium    S/P vaginal hysterectomy 2016     Priority: Medium    High risk medication use 2016     Priority: Medium    Gallstone 09/10/2015     Priority: Medium     Past Surgical History:   Procedure Laterality Date    ABDOMEN SURGERY      lap for endometriosis    ANKLE ARTHROPLASTY      ankle surgery; pt states she has plates, no pins.     SECTION      HYSTERECTOMY  2016    LAPAROSCOPIC CHOLECYSTECTOMY N/A 2015    Procedure: CHOLECYSTECTOMY LAPAROSCOPIC;  Surgeon: Nithin PANG MD;  Location: [facility];  Service:     OOPHORECTOMY  2019    OTHER  SURGICAL HISTORY  07/13/2016    Laparoscopic vaginal hysterectomy, bilateral salpingectomyfailed ablation / [facility]     TONSILLECTOMY        Past Medical History:   Diagnosis Date    Asthma     aggravated with colds    History of anesthesia complications     PONV (postoperative nausea and vomiting)     Rheumatoid arthritis (H)      Allergies   Allergen Reactions    Penicillins Anaphylaxis    Penicillin V Potassium [Penicillin V] Other (See Comments)     Current Outpatient Medications   Medication Sig Dispense Refill    adalimumab (HUMIRA *CF* PEN) 40 MG/0.4ML pen kit INJECT 0.4 ML (40 MG) UNDER THE SKIN EVERY 14 DAYS. HOLD FOR SIGNS OF INFECTION, THEN SEEK MEDICAL ATTENTION 2 each 5    aspirin 81 MG EC tablet [ASPIRIN 81 MG EC TABLET] Take 81 mg by mouth daily.      cetirizine (ZYRTEC) 10 MG tablet [CETIRIZINE (ZYRTEC) 10 MG TABLET] Take 10 mg by mouth daily.      DULoxetine (CYMBALTA) 30 MG capsule TAKE 1 CAPSULE DAILY 90 capsule 0    ergocalciferol (VITAMIN D2) 50,000 unit capsule Take 2,000 Units by mouth daily      estradiol (ESTRACE) 2 MG tablet [ESTRADIOL (ESTRACE) 2 MG TABLET] Take 2 mg by mouth daily.             folic acid (FOLVITE) 1 MG tablet Take 1 tablet (1,000 mcg) by mouth daily 90 tablet 3    methotrexate 2.5 MG tablet TAKE 10 TABLETS EVERY 7 DAYS 120 tablet 0     family history includes Cancer in her sister; Diabetes in her maternal grandmother and mother; Hyperlipidemia in her father; Hypertension in her father; Rheumatoid Arthritis in her mother.  Social Connections: Not on file          WBC Count   Date Value Ref Range Status   09/15/2023 6.3 4.0 - 11.0 10e3/uL Final     RBC Count   Date Value Ref Range Status   09/15/2023 4.07 3.80 - 5.20 10e6/uL Final     Hemoglobin   Date Value Ref Range Status   09/15/2023 12.2 11.7 - 15.7 g/dL Final     Hematocrit   Date Value Ref Range Status   09/15/2023 36.8 35.0 - 47.0 % Final     MCV   Date Value Ref Range Status   09/15/2023 90 78 - 100 fL  Final     MCH   Date Value Ref Range Status   09/15/2023 30.0 26.5 - 33.0 pg Final     Platelet Count   Date Value Ref Range Status   09/15/2023 293 150 - 450 10e3/uL Final     ALT   Date Value Ref Range Status   09/15/2023 16 0 - 50 U/L Final     Comment:     Reference intervals for this test were updated on 6/12/2023 to more accurately reflect our healthy population. There may be differences in the flagging of prior results with similar values performed with this method. Interpretation of those prior results can be made in the context of the updated reference intervals.       Albumin   Date Value Ref Range Status   09/15/2023 4.2 3.5 - 5.2 g/dL Final   06/14/2022 3.8 3.5 - 5.0 g/dL Final     Creatinine   Date Value Ref Range Status   09/15/2023 0.67 0.51 - 0.95 mg/dL Final     GFR Estimate   Date Value Ref Range Status   09/15/2023 >90 >60 mL/min/1.73m2 Final   06/07/2021 >60 >60 mL/min/1.73m2 Final     GFR Estimate If Black   Date Value Ref Range Status   06/07/2021 >60 >60 mL/min/1.73m2 Final

## 2023-10-25 DIAGNOSIS — M06.4 INFLAMMATORY POLYARTHRITIS (H): ICD-10-CM

## 2023-10-26 RX ORDER — FOLIC ACID 1 MG/1
1000 TABLET ORAL DAILY
Qty: 90 TABLET | Refills: 3 | Status: SHIPPED | OUTPATIENT
Start: 2023-10-26 | End: 2024-04-18

## 2023-12-19 ENCOUNTER — HOSPITAL ENCOUNTER (OUTPATIENT)
Dept: MAMMOGRAPHY | Facility: CLINIC | Age: 53
Discharge: HOME | End: 2023-12-19
Attending: STUDENT IN AN ORGANIZED HEALTH CARE EDUCATION/TRAINING PROGRAM
Payer: COMMERCIAL

## 2023-12-19 DIAGNOSIS — N63.20 LEFT BREAST LUMP: ICD-10-CM

## 2023-12-19 PROCEDURE — 76642 ULTRASOUND BREAST LIMITED: CPT | Mod: LT

## 2024-01-12 ENCOUNTER — LAB (OUTPATIENT)
Dept: LAB | Facility: CLINIC | Age: 54
End: 2024-01-12
Payer: COMMERCIAL

## 2024-01-12 DIAGNOSIS — L40.50 PSORIATIC ARTHRITIS (H): ICD-10-CM

## 2024-01-12 LAB
ERYTHROCYTE [DISTWIDTH] IN BLOOD BY AUTOMATED COUNT: 14.9 % (ref 10–15)
HCT VFR BLD AUTO: 38.4 % (ref 35–47)
HGB BLD-MCNC: 12.7 G/DL (ref 11.7–15.7)
MCH RBC QN AUTO: 30.3 PG (ref 26.5–33)
MCHC RBC AUTO-ENTMCNC: 33.1 G/DL (ref 31.5–36.5)
MCV RBC AUTO: 92 FL (ref 78–100)
PLATELET # BLD AUTO: 304 10E3/UL (ref 150–450)
RBC # BLD AUTO: 4.19 10E6/UL (ref 3.8–5.2)
WBC # BLD AUTO: 7.6 10E3/UL (ref 4–11)

## 2024-01-12 PROCEDURE — 82565 ASSAY OF CREATININE: CPT

## 2024-01-12 PROCEDURE — 82040 ASSAY OF SERUM ALBUMIN: CPT

## 2024-01-12 PROCEDURE — 36415 COLL VENOUS BLD VENIPUNCTURE: CPT

## 2024-01-12 PROCEDURE — 85027 COMPLETE CBC AUTOMATED: CPT

## 2024-01-12 PROCEDURE — 84460 ALANINE AMINO (ALT) (SGPT): CPT

## 2024-01-13 LAB
ALBUMIN SERPL BCG-MCNC: 4.3 G/DL (ref 3.5–5.2)
ALT SERPL W P-5'-P-CCNC: 25 U/L (ref 0–50)
CREAT SERPL-MCNC: 0.7 MG/DL (ref 0.51–0.95)
EGFRCR SERPLBLD CKD-EPI 2021: >90 ML/MIN/1.73M2

## 2024-03-26 ENCOUNTER — MYC REFILL (OUTPATIENT)
Dept: RHEUMATOLOGY | Facility: CLINIC | Age: 54
End: 2024-03-26
Payer: COMMERCIAL

## 2024-03-26 DIAGNOSIS — M35.3 POLYMYALGIA (H): ICD-10-CM

## 2024-03-26 DIAGNOSIS — M77.9 ENTHESITIS: ICD-10-CM

## 2024-03-27 RX ORDER — DULOXETIN HYDROCHLORIDE 30 MG/1
30 CAPSULE, DELAYED RELEASE ORAL DAILY
Qty: 90 CAPSULE | Refills: 0 | Status: SHIPPED | OUTPATIENT
Start: 2024-03-27 | End: 2024-07-26

## 2024-04-01 ENCOUNTER — LAB REQUISITION (OUTPATIENT)
Dept: LAB | Facility: CLINIC | Age: 54
End: 2024-04-01

## 2024-04-01 DIAGNOSIS — Z13.0 ENCOUNTER FOR SCREENING FOR DISEASES OF THE BLOOD AND BLOOD-FORMING ORGANS AND CERTAIN DISORDERS INVOLVING THE IMMUNE MECHANISM: ICD-10-CM

## 2024-04-01 DIAGNOSIS — Z13.1 ENCOUNTER FOR SCREENING FOR DIABETES MELLITUS: ICD-10-CM

## 2024-04-01 LAB
ERYTHROCYTE [DISTWIDTH] IN BLOOD BY AUTOMATED COUNT: 15 % (ref 10–15)
HBA1C MFR BLD: 5.4 %
HCT VFR BLD AUTO: 39.4 % (ref 35–47)
HGB BLD-MCNC: 13.1 G/DL (ref 11.7–15.7)
MCH RBC QN AUTO: 30.4 PG (ref 26.5–33)
MCHC RBC AUTO-ENTMCNC: 33.2 G/DL (ref 31.5–36.5)
MCV RBC AUTO: 91 FL (ref 78–100)
PLATELET # BLD AUTO: 340 10E3/UL (ref 150–450)
RBC # BLD AUTO: 4.31 10E6/UL (ref 3.8–5.2)
WBC # BLD AUTO: 6.1 10E3/UL (ref 4–11)

## 2024-04-01 PROCEDURE — 83036 HEMOGLOBIN GLYCOSYLATED A1C: CPT | Performed by: OBSTETRICS & GYNECOLOGY

## 2024-04-01 PROCEDURE — 85027 COMPLETE CBC AUTOMATED: CPT | Performed by: OBSTETRICS & GYNECOLOGY

## 2024-04-03 ENCOUNTER — TELEPHONE (OUTPATIENT)
Dept: RHEUMATOLOGY | Facility: CLINIC | Age: 54
End: 2024-04-03
Payer: COMMERCIAL

## 2024-04-04 NOTE — TELEPHONE ENCOUNTER
PA Initiation    Medication: HUMIRA *CF* PEN 40 MG/0.4ML SC PNKT  Insurance Company: [employer] Non-Specialty PA's - Phone [phone] Fax [phone]  Pharmacy Filling the Rx:    Filling Pharmacy Phone:    Filling Pharmacy Fax:    Start Date: 4/4/2024      [id number]     Waiting Q set       KARISSA Herrmann, [facility]  Specialty Pharmacy Clinic Liaison     [facility]    [email or web address]     Phone: [phone]  Fax: [phone]    '

## 2024-04-09 NOTE — TELEPHONE ENCOUNTER
Requested Q set called plan      Was able to do Q over phone   Prior Authorization Approval    Medication: HUMIRA *CF* PEN 40 MG/0.4ML SC PNKT  Authorization Effective Date:  03/10/2024  Authorization Expiration Date:  03/09/2025  Approved Dose/Quantity: 2  Reference #: [id number]  case [id number]  Insurance Company: [employer] Non-Specialty PA's - Phone [phone] Fax [phone]          KARISSA Herrmann, [facility]  Specialty Pharmacy Clinic Liaison     [facility]    [email or web address]     Phone: [phone]  Fax: [phone]

## 2024-04-13 ENCOUNTER — LAB (OUTPATIENT)
Dept: LAB | Facility: CLINIC | Age: 54
End: 2024-04-13
Payer: COMMERCIAL

## 2024-04-13 DIAGNOSIS — L40.50 PSORIATIC ARTHRITIS (H): ICD-10-CM

## 2024-04-13 LAB
ALBUMIN SERPL BCG-MCNC: 4.3 G/DL (ref 3.5–5.2)
ALT SERPL W P-5'-P-CCNC: 20 U/L (ref 0–50)
CREAT SERPL-MCNC: 0.72 MG/DL (ref 0.51–0.95)
EGFRCR SERPLBLD CKD-EPI 2021: >90 ML/MIN/1.73M2
ERYTHROCYTE [DISTWIDTH] IN BLOOD BY AUTOMATED COUNT: 14.6 % (ref 10–15)
HCT VFR BLD AUTO: 36.6 % (ref 35–47)
HGB BLD-MCNC: 12.5 G/DL (ref 11.7–15.7)
MCH RBC QN AUTO: 30.9 PG (ref 26.5–33)
MCHC RBC AUTO-ENTMCNC: 34.2 G/DL (ref 31.5–36.5)
MCV RBC AUTO: 90 FL (ref 78–100)
PLATELET # BLD AUTO: 293 10E3/UL (ref 150–450)
RBC # BLD AUTO: 4.05 10E6/UL (ref 3.8–5.2)
WBC # BLD AUTO: 6 10E3/UL (ref 4–11)

## 2024-04-13 PROCEDURE — 85027 COMPLETE CBC AUTOMATED: CPT

## 2024-04-13 PROCEDURE — 82565 ASSAY OF CREATININE: CPT

## 2024-04-13 PROCEDURE — 36415 COLL VENOUS BLD VENIPUNCTURE: CPT

## 2024-04-13 PROCEDURE — 82040 ASSAY OF SERUM ALBUMIN: CPT

## 2024-04-13 PROCEDURE — 84460 ALANINE AMINO (ALT) (SGPT): CPT

## 2024-04-18 ENCOUNTER — VIRTUAL VISIT (OUTPATIENT)
Dept: RHEUMATOLOGY | Facility: CLINIC | Age: 54
End: 2024-04-18
Payer: COMMERCIAL

## 2024-04-18 DIAGNOSIS — M35.3 POLYMYALGIA (H): ICD-10-CM

## 2024-04-18 DIAGNOSIS — M77.9 ENTHESITIS: ICD-10-CM

## 2024-04-18 DIAGNOSIS — L40.50 PSORIATIC ARTHRITIS (H): Primary | ICD-10-CM

## 2024-04-18 DIAGNOSIS — M54.40 LOW BACK PAIN OF THORACOLUMBAR REGION WITH SCIATICA: ICD-10-CM

## 2024-04-18 DIAGNOSIS — Z79.899 HIGH RISK MEDICATION USE: ICD-10-CM

## 2024-04-18 PROCEDURE — 99214 OFFICE O/P EST MOD 30 MIN: CPT | Mod: 95 | Performed by: INTERNAL MEDICINE

## 2024-04-18 PROCEDURE — G2211 COMPLEX E/M VISIT ADD ON: HCPCS | Mod: 95 | Performed by: INTERNAL MEDICINE

## 2024-04-18 RX ORDER — METHOTREXATE 2.5 MG/1
25 TABLET ORAL
Qty: 120 TABLET | Refills: 0 | Status: SHIPPED | OUTPATIENT
Start: 2024-04-18 | End: 2024-07-26

## 2024-04-18 RX ORDER — VITAMIN B COMPLEX
2 TABLET ORAL DAILY
COMMUNITY

## 2024-04-18 RX ORDER — FOLIC ACID 1 MG/1
1000 TABLET ORAL DAILY
Qty: 90 TABLET | Refills: 3 | Status: SHIPPED | OUTPATIENT
Start: 2024-04-18

## 2024-04-18 NOTE — PROGRESS NOTES
Virtual Visit Details    Type of service:  Video Visit     Originating Location (pt. Location): Home    Distant Location (provider location):  On-site  Platform used for Video Visit: Monique    This document was created using a software with less than 100% fidelity, at times resulting in unintended, even erroneous syntax and grammar.  The reader is advised to keep this under consideration while reviewing, interpreting this note.           ASSESSMENT AND PLAN:    Diagnoses and all orders for this visit:  Psoriatic arthritis (H)  -     methotrexate 2.5 MG tablet; Take 10 tablets (25 mg) by mouth every 7 days for 90 days  Enthesitis  -     methotrexate 2.5 MG tablet; Take 10 tablets (25 mg) by mouth every 7 days for 90 days  High risk medication use  -     folic acid (FOLVITE) 1 MG tablet; Take 1 tablet (1,000 mcg) by mouth daily  Low back pain of thoracolumbar region with sciatica  Polymyalgia (H24)    The longitudinal plan of care for the diagnosis(es)/condition(s) as documented were addressed during this visit. Due to the added complexity in care, I will continue to support Essence in the subsequent management and with ongoing continuity of care.   Follow up in 3 months      HISTORY OF PRESENTING ILLNESS:  Essence Rubio 53 year old is evaluated here via video/audio link.     psoriatic arthritis, in view of the sisters history of psoriasis, she herself has had inflammatory joint disease, enthesitis on methotrexate with folic acid and Humira.  On her previous visit she was started on duloxetine in addition.  She feels so much better overall.  Her trochanteric area pain is improved.  She noted overall pain level to be 1.0 occasional discomfort in her DIPs, PIPs.  There is no sustained stiffness beyond the 20 minutes.  She is able to do all her day-to-day activities without difficulty.  She had labs drawn recently which are within acceptable range.  She wondered how would she go about reducing or stopping duloxetine  Humira on board now.  We talked about doing this and a protocol is given.  Recent labs are normal.        ROS enquiry held for fever, ocular symptoms, rash, headache,  GI issues.   ALLERGIES:Penicillins and Penicillin v potassium [penicillin v]    PAST MEDICAL/ACTIVE PROBLEMS/MEDICATION/SOCIAL DATA  Past Medical History:   Diagnosis Date    Asthma     aggravated with colds    History of anesthesia complications     PONV (postoperative nausea and vomiting)     Rheumatoid arthritis (H)      History   Smoking Status    Never   Smokeless Tobacco    Never     Patient Active Problem List   Diagnosis    Gallstone    High risk medication use    S/P vaginal hysterectomy    Trochanteric bursitis of both hips    Acute bilateral low back pain with sciatica    Family history of psoriasis in sister    Piriformis syndrome, unspecified laterality    Enthesitis -knee    Psoriatic arthritis (H)    1st MTP arthritis    Polymyalgia (H24)     Current Outpatient Medications   Medication Sig Dispense Refill    adalimumab (HUMIRA *CF* PEN) 40 MG/0.4ML pen kit INJECT 0.4 ML (40 MG) UNDER THE SKIN EVERY 14 DAYS. HOLD FOR SIGNS OF INFECTION, THEN SEEK MEDICAL ATTENTION 2 each 5    aspirin 81 MG EC tablet [ASPIRIN 81 MG EC TABLET] Take 81 mg by mouth daily.      cetirizine (ZYRTEC) 10 MG tablet [CETIRIZINE (ZYRTEC) 10 MG TABLET] Take 10 mg by mouth daily.      DULoxetine (CYMBALTA) 30 MG capsule Take 1 capsule (30 mg) by mouth daily 90 capsule 0    estradiol (ESTRACE) 2 MG tablet [ESTRADIOL (ESTRACE) 2 MG TABLET] Take 2 mg by mouth daily.             folic acid (FOLVITE) 1 MG tablet TAKE 1 TABLET DAILY 90 tablet 3    methotrexate 2.5 MG tablet TAKE 10 TABLETS EVERY 7 DAYS 120 tablet 0    vitamin D3 25 mcg (1000 units) tablet Take 2 tablets by mouth daily           EXAMINATION:    Using the audio and video link as best as possible the constitutional, neck, neurologic, psych, skin, both upper extremities areas/organ system were evaluated during  "this assessment.  Some of the important findings: Alert, oriented, speech fluent.    Able to fully flex the digits, into fists bilaterally, wrist and elbow range of motion appear normal, abduction of the shoulder is normal.      LAB / IMAGING DATA:  ALT   Date Value Ref Range Status   04/13/2024 20 0 - 50 U/L Final   04/01/2024 36 0 - 50 U/L Final     Comment:     Reference intervals for this test were updated on 6/12/2023 to more accurately reflect our healthy population. There may be differences in the flagging of prior results with similar values performed with this method. Interpretation of those prior results can be made in the context of the updated reference intervals.     01/12/2024 25 0 - 50 U/L Final     Albumin   Date Value Ref Range Status   04/13/2024 4.3 3.5 - 5.2 g/dL Final   04/01/2024 4.5 3.5 - 5.2 g/dL Final   01/12/2024 4.3 3.5 - 5.2 g/dL Final   06/14/2022 3.8 3.5 - 5.0 g/dL Final   03/04/2022 3.7 3.5 - 5.0 g/dL Final   11/30/2021 3.7 3.5 - 5.0 g/dL Final       WBC Count   Date Value Ref Range Status   04/13/2024 6.0 4.0 - 11.0 10e3/uL Final   04/01/2024 6.1 4.0 - 11.0 10e3/uL Final     Hemoglobin   Date Value Ref Range Status   04/13/2024 12.5 11.7 - 15.7 g/dL Final   04/01/2024 13.1 11.7 - 15.7 g/dL Final   01/12/2024 12.7 11.7 - 15.7 g/dL Final     Platelet Count   Date Value Ref Range Status   04/13/2024 293 150 - 450 10e3/uL Final   04/01/2024 340 150 - 450 10e3/uL Final   01/12/2024 304 150 - 450 10e3/uL Final       No results found for: \"PRANEETH\"    "

## 2024-05-17 DIAGNOSIS — M77.9 ENTHESITIS: ICD-10-CM

## 2024-05-17 DIAGNOSIS — L40.50 PSORIATIC ARTHRITIS (H): ICD-10-CM

## 2024-05-17 RX ORDER — ADALIMUMAB 40MG/0.4ML
KIT SUBCUTANEOUS
Qty: 0.8 ML | Refills: 4 | Status: SHIPPED | OUTPATIENT
Start: 2024-05-17 | End: 2024-10-01

## 2024-07-10 ENCOUNTER — TELEPHONE (OUTPATIENT)
Dept: RHEUMATOLOGY | Facility: CLINIC | Age: 54
End: 2024-07-10
Payer: COMMERCIAL

## 2024-07-10 DIAGNOSIS — L40.50 PSORIATIC ARTHRITIS (H): ICD-10-CM

## 2024-07-10 DIAGNOSIS — M77.9 ENTHESITIS: ICD-10-CM

## 2024-07-10 DIAGNOSIS — M35.3 POLYMYALGIA (H): ICD-10-CM

## 2024-07-25 ENCOUNTER — LAB (OUTPATIENT)
Dept: LAB | Facility: CLINIC | Age: 54
End: 2024-07-25
Payer: COMMERCIAL

## 2024-07-25 DIAGNOSIS — L40.50 PSORIATIC ARTHRITIS (H): ICD-10-CM

## 2024-07-25 LAB
ALBUMIN SERPL BCG-MCNC: 4.1 G/DL (ref 3.5–5.2)
ALT SERPL W P-5'-P-CCNC: 28 U/L (ref 0–50)
CREAT SERPL-MCNC: 0.69 MG/DL (ref 0.51–0.95)
EGFRCR SERPLBLD CKD-EPI 2021: >90 ML/MIN/1.73M2
ERYTHROCYTE [DISTWIDTH] IN BLOOD BY AUTOMATED COUNT: 14.4 % (ref 10–15)
HCT VFR BLD AUTO: 35.7 % (ref 35–47)
HGB BLD-MCNC: 11.8 G/DL (ref 11.7–15.7)
MCH RBC QN AUTO: 30 PG (ref 26.5–33)
MCHC RBC AUTO-ENTMCNC: 33.1 G/DL (ref 31.5–36.5)
MCV RBC AUTO: 91 FL (ref 78–100)
PLATELET # BLD AUTO: 319 10E3/UL (ref 150–450)
RBC # BLD AUTO: 3.93 10E6/UL (ref 3.8–5.2)
WBC # BLD AUTO: 6.1 10E3/UL (ref 4–11)

## 2024-07-25 PROCEDURE — 85027 COMPLETE CBC AUTOMATED: CPT

## 2024-07-25 PROCEDURE — 36415 COLL VENOUS BLD VENIPUNCTURE: CPT

## 2024-07-25 PROCEDURE — 84460 ALANINE AMINO (ALT) (SGPT): CPT

## 2024-07-25 PROCEDURE — 82040 ASSAY OF SERUM ALBUMIN: CPT

## 2024-07-25 PROCEDURE — 82565 ASSAY OF CREATININE: CPT

## 2024-07-26 RX ORDER — METHOTREXATE 2.5 MG/1
TABLET ORAL
Qty: 120 TABLET | Refills: 0 | Status: SHIPPED | OUTPATIENT
Start: 2024-07-26 | End: 2024-10-03

## 2024-07-26 RX ORDER — DULOXETIN HYDROCHLORIDE 30 MG/1
30 CAPSULE, DELAYED RELEASE ORAL DAILY
Qty: 90 CAPSULE | Refills: 0 | Status: SHIPPED | OUTPATIENT
Start: 2024-07-26

## 2024-08-09 DIAGNOSIS — M77.9 ENTHESITIS: ICD-10-CM

## 2024-08-09 DIAGNOSIS — L40.50 PSORIATIC ARTHRITIS (H): ICD-10-CM

## 2024-08-09 RX ORDER — ADALIMUMAB 40MG/0.4ML
KIT SUBCUTANEOUS
OUTPATIENT
Start: 2024-08-09

## 2024-08-09 NOTE — TELEPHONE ENCOUNTER
Pt should have refills on file 5 months given 5/17/24    Requested Prescriptions   Pending Prescriptions Disp Refills    HUMIRA *CF* PEN 40 MG/0.4ML pen kit [Pharmacy Med Name: HUMIRA(CF) PEN 40 MG/0.4 ML]       Sig: INJECT 0.4 ML (40 MG) UNDER THE SKIN EVERY 14 DAYS. HOLD FOR SIGNS OF INFECTION, THEN SEEK MEDICAL ATTENTION       There is no refill protocol information for this order

## 2024-08-12 ENCOUNTER — HOSPITAL ENCOUNTER (OUTPATIENT)
Dept: MAMMOGRAPHY | Facility: CLINIC | Age: 54
Discharge: HOME | End: 2024-08-12
Attending: STUDENT IN AN ORGANIZED HEALTH CARE EDUCATION/TRAINING PROGRAM | Admitting: STUDENT IN AN ORGANIZED HEALTH CARE EDUCATION/TRAINING PROGRAM
Payer: COMMERCIAL

## 2024-08-12 DIAGNOSIS — Z12.31 VISIT FOR SCREENING MAMMOGRAM: ICD-10-CM

## 2024-08-12 PROCEDURE — 77063 BREAST TOMOSYNTHESIS BI: CPT

## 2024-09-10 DIAGNOSIS — L40.50 PSORIATIC ARTHRITIS (H): ICD-10-CM

## 2024-09-10 DIAGNOSIS — M77.9 ENTHESITIS: ICD-10-CM

## 2024-09-14 ENCOUNTER — DOCUMENTATION ONLY (OUTPATIENT)
Dept: RHEUMATOLOGY | Facility: CLINIC | Age: 54
End: 2024-09-14
Payer: COMMERCIAL

## 2024-09-16 DIAGNOSIS — L40.50 PSORIATIC ARTHRITIS (H): Primary | ICD-10-CM

## 2024-09-30 ENCOUNTER — LAB (OUTPATIENT)
Dept: LAB | Facility: CLINIC | Age: 54
End: 2024-09-30
Payer: COMMERCIAL

## 2024-09-30 DIAGNOSIS — L40.50 PSORIATIC ARTHRITIS (H): ICD-10-CM

## 2024-09-30 LAB
ALBUMIN SERPL BCG-MCNC: 4.3 G/DL (ref 3.5–5.2)
ALT SERPL W P-5'-P-CCNC: 33 U/L (ref 0–50)
CREAT SERPL-MCNC: 0.75 MG/DL (ref 0.51–0.95)
EGFRCR SERPLBLD CKD-EPI 2021: >90 ML/MIN/1.73M2
ERYTHROCYTE [DISTWIDTH] IN BLOOD BY AUTOMATED COUNT: 14.3 % (ref 10–15)
HCT VFR BLD AUTO: 36.5 % (ref 35–47)
HGB BLD-MCNC: 12.4 G/DL (ref 11.7–15.7)
MCH RBC QN AUTO: 30.5 PG (ref 26.5–33)
MCHC RBC AUTO-ENTMCNC: 34 G/DL (ref 31.5–36.5)
MCV RBC AUTO: 90 FL (ref 78–100)
PLATELET # BLD AUTO: 287 10E3/UL (ref 150–450)
RBC # BLD AUTO: 4.07 10E6/UL (ref 3.8–5.2)
WBC # BLD AUTO: 5.5 10E3/UL (ref 4–11)

## 2024-09-30 PROCEDURE — 85027 COMPLETE CBC AUTOMATED: CPT

## 2024-09-30 PROCEDURE — 82565 ASSAY OF CREATININE: CPT

## 2024-09-30 PROCEDURE — 84460 ALANINE AMINO (ALT) (SGPT): CPT

## 2024-09-30 PROCEDURE — 82040 ASSAY OF SERUM ALBUMIN: CPT

## 2024-09-30 PROCEDURE — 36415 COLL VENOUS BLD VENIPUNCTURE: CPT

## 2024-10-01 ENCOUNTER — MYC REFILL (OUTPATIENT)
Dept: RHEUMATOLOGY | Facility: CLINIC | Age: 54
End: 2024-10-01
Payer: COMMERCIAL

## 2024-10-01 DIAGNOSIS — L40.50 PSORIATIC ARTHRITIS (H): ICD-10-CM

## 2024-10-01 DIAGNOSIS — M77.9 ENTHESITIS: ICD-10-CM

## 2024-10-01 RX ORDER — ADALIMUMAB 40MG/0.4ML
KIT SUBCUTANEOUS
Qty: 0.8 ML | Refills: 0 | Status: SHIPPED | OUTPATIENT
Start: 2024-10-01

## 2024-10-03 DIAGNOSIS — M77.9 ENTHESITIS: ICD-10-CM

## 2024-10-03 DIAGNOSIS — L40.50 PSORIATIC ARTHRITIS (H): ICD-10-CM

## 2024-10-03 RX ORDER — METHOTREXATE 2.5 MG/1
TABLET ORAL
Qty: 40 TABLET | Refills: 0 | Status: SHIPPED | OUTPATIENT
Start: 2024-10-03 | End: 2024-10-30

## 2024-10-08 DIAGNOSIS — M35.3 POLYMYALGIA (H): ICD-10-CM

## 2024-10-08 DIAGNOSIS — M77.9 ENTHESITIS: ICD-10-CM

## 2024-10-09 RX ORDER — DULOXETIN HYDROCHLORIDE 30 MG/1
30 CAPSULE, DELAYED RELEASE ORAL DAILY
Qty: 90 CAPSULE | Refills: 3 | Status: SHIPPED | OUTPATIENT
Start: 2024-10-09

## 2024-10-21 RX ORDER — ADALIMUMAB-RYVK 40MG/0.4ML
KIT SUBCUTANEOUS
Qty: 2 EACH | Refills: 1 | OUTPATIENT
Start: 2024-10-21

## 2024-10-30 ENCOUNTER — OFFICE VISIT (OUTPATIENT)
Dept: RHEUMATOLOGY | Facility: CLINIC | Age: 54
End: 2024-10-30
Payer: COMMERCIAL

## 2024-10-30 VITALS
HEART RATE: 86 BPM | WEIGHT: 200.7 LBS | DIASTOLIC BLOOD PRESSURE: 79 MMHG | OXYGEN SATURATION: 96 % | SYSTOLIC BLOOD PRESSURE: 132 MMHG | BODY MASS INDEX: 30.97 KG/M2

## 2024-10-30 DIAGNOSIS — Z79.899 HIGH RISK MEDICATION USE: ICD-10-CM

## 2024-10-30 DIAGNOSIS — L40.50 PSORIATIC ARTHRITIS (H): Primary | ICD-10-CM

## 2024-10-30 DIAGNOSIS — M77.9 ENTHESITIS: ICD-10-CM

## 2024-10-30 PROCEDURE — 99214 OFFICE O/P EST MOD 30 MIN: CPT | Performed by: INTERNAL MEDICINE

## 2024-10-30 PROCEDURE — G2211 COMPLEX E/M VISIT ADD ON: HCPCS | Performed by: INTERNAL MEDICINE

## 2024-10-30 RX ORDER — ADALIMUMAB-RYVK 40MG/0.4ML
40 KIT SUBCUTANEOUS
Qty: 2.4 ML | Refills: 0 | Status: SHIPPED | OUTPATIENT
Start: 2024-10-30 | End: 2025-01-28

## 2024-10-30 RX ORDER — METHOTREXATE 2.5 MG/1
25 TABLET ORAL
Qty: 128.57 TABLET | Refills: 0 | Status: SHIPPED | OUTPATIENT
Start: 2024-10-30 | End: 2025-01-28

## 2024-10-30 NOTE — PROGRESS NOTES
Rheumatology follow-up visit note     Essence is a 54 year old female presents today for follow-up.    Essence was seen today for recheck.    Diagnoses and all orders for this visit:    Psoriatic arthritis (H)  -     methotrexate 2.5 MG tablet; Take 10 tablets (25 mg) by mouth every 7 days.  -     adalimumab-ryvk (SIMLANDI, 2 PEN,) 40 MG/0.4ML auto-injector kit; Inject 0.4 mLs (40 mg) subcutaneously every 14 days.    Enthesitis  -     methotrexate 2.5 MG tablet; Take 10 tablets (25 mg) by mouth every 7 days.  -     adalimumab-ryvk (SIMLANDI, 2 PEN,) 40 MG/0.4ML auto-injector kit; Inject 0.4 mLs (40 mg) subcutaneously every 14 days.    High risk medication use        The longitudinal plan of care for the diagnosis(es)/condition(s) as documented were addressed during this visit. Due to the added complexity in care, I will continue to support Essence in the subsequent management and with ongoing continuity of care.      Follow up in 6 months.    HPI    Essence Rubio is a 54 year old female is here for follow-up of psoriatic arthritis, in view of the sisters history of psoriasis, she herself has had inflammatory joint disease, enthesitis on methotrexate with folic acid and Humira now the biosimilar Simlandi, .  On her previous visit she was started on duloxetine in addition.  She feels so much better overall.  Her trochanteric area pain is improved.  She noted overall pain level to be 1.0 occasional discomfort in her DIPs, PIPs.  There is no sustained stiffness beyond the 20 minutes.  She is able to do all her day-to-day activities without difficulty.  She had labs drawn recently which are within acceptable range.  Recently in the process of changing their homes there was some break in getting the Humira that resulted in worsening symptoms out of subsided since now that she is back on it.  Recent labs are normal.    DETAILED EXAMINATION  10/30/24  :    Vitals:    10/30/24 1231   BP: 132/79   BP Location: Right arm    Pulse: 86   SpO2: 96%   Weight: 91 kg (200 lb 11.2 oz)     Alert oriented. Head including the face is examined for malar rash, heliotropes, scarring, lupus pernio. Eyes examined for redness such as in episcleritis/scleritis, periorbital lesions.   Neck/ Face examined for parotid gland swelling, range of motion of neck.  Left upper and lower and right upper and lower extremities examined for tenderness, swelling, warmth of the appendicular joints, range of motion, edema, rash.  Some of the important findings included: she does not have evidence of synovitis in the palpable joints of the upper extremities.  No significant deformities of the digits.  no Heberden nodes.  Range of motion of the shoulders  show full abduction.  No JLT effusion or warmth of the knees.  she does not have dactylitis of the digits.  Her gait is normal.     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 arthritis 2019     Priority: Medium    Enthesitis -knee 2018     Priority: Medium    Psoriatic arthritis (H) 2018     Priority: Medium    Piriformis syndrome, unspecified laterality 10/17/2017     Priority: Medium    Family history of psoriasis in sister 2017     Priority: Medium    Acute bilateral low back pain with sciatica 2017     Priority: Medium    Trochanteric bursitis of both hips 2016     Priority: Medium    S/P vaginal hysterectomy 2016     Priority: Medium    High risk medication use 2016     Priority: Medium    Gallstone 09/10/2015     Priority: Medium     Past Surgical History:   Procedure Laterality Date    ABDOMEN SURGERY      lap for endometriosis    ANKLE ARTHROPLASTY      ankle surgery; pt states she has plates, no pins.     SECTION      HYSTERECTOMY  2016    LAPAROSCOPIC CHOLECYSTECTOMY N/A 2015    Procedure: CHOLECYSTECTOMY LAPAROSCOPIC;  Surgeon: Nithin PANG MD;  Location: [facility];   Service:     OOPHORECTOMY  05/2019    OTHER SURGICAL HISTORY  07/13/2016    Laparoscopic vaginal hysterectomy, bilateral salpingectomyfailed ablation / [facility]     TONSILLECTOMY        Past Medical History:   Diagnosis Date    Asthma     aggravated with colds    History of anesthesia complications     PONV (postoperative nausea and vomiting)     Rheumatoid arthritis (H)      Allergies   Allergen Reactions    Penicillins Anaphylaxis    Penicillin V Potassium [Penicillin V] Other (See Comments)     Current Outpatient Medications   Medication Sig Dispense Refill    adalimumab (HUMIRA *CF* PEN) 40 MG/0.4ML pen kit INJECT 0.4 ML (40 MG) UNDER THE SKIN EVERY 14 DAYS. HOLD FOR SIGNS OF INFECTION, THEN SEEK MEDICAL ATTENTION 0.8 mL 0    aspirin 81 MG EC tablet [ASPIRIN 81 MG EC TABLET] Take 81 mg by mouth daily.      cetirizine (ZYRTEC) 10 MG tablet [CETIRIZINE (ZYRTEC) 10 MG TABLET] Take 10 mg by mouth daily.      DULoxetine (CYMBALTA) 30 MG capsule TAKE 1 CAPSULE DAILY 90 capsule 3    estradiol (ESTRACE) 2 MG tablet [ESTRADIOL (ESTRACE) 2 MG TABLET] Take 2 mg by mouth daily.             folic acid (FOLVITE) 1 MG tablet Take 1 tablet (1,000 mcg) by mouth daily 90 tablet 3    methotrexate 2.5 MG tablet TAKE 10 TABLETS EVERY 7 DAYS 40 tablet 0    vitamin D3 25 mcg (1000 units) tablet Take 2 tablets by mouth daily       family history includes Cancer in her sister; Diabetes in her maternal grandmother and mother; Hyperlipidemia in her father; Hypertension in her father; Rheumatoid Arthritis in her mother.  Social Connections: Not on file          WBC Count   Date Value Ref Range Status   09/30/2024 5.5 4.0 - 11.0 10e3/uL Final     RBC Count   Date Value Ref Range Status   09/30/2024 4.07 3.80 - 5.20 10e6/uL Final     Hemoglobin   Date Value Ref Range Status   09/30/2024 12.4 11.7 - 15.7 g/dL Final     Hematocrit   Date Value Ref Range Status   09/30/2024 36.5 35.0 - 47.0 % Final     MCV   Date Value Ref Range  Status   09/30/2024 90 78 - 100 fL Final     MCH   Date Value Ref Range Status   09/30/2024 30.5 26.5 - 33.0 pg Final     Platelet Count   Date Value Ref Range Status   09/30/2024 287 150 - 450 10e3/uL Final     AST   Date Value Ref Range Status   04/01/2024 31 0 - 45 U/L Final     Comment:     Reference intervals for this test were updated on 6/12/2023 to more accurately reflect our healthy population. There may be differences in the flagging of prior results with similar values performed with this method. Interpretation of those prior results can be made in the context of the updated reference intervals.     ALT   Date Value Ref Range Status   09/30/2024 33 0 - 50 U/L Final     Albumin   Date Value Ref Range Status   09/30/2024 4.3 3.5 - 5.2 g/dL Final   06/14/2022 3.8 3.5 - 5.0 g/dL Final     Alkaline Phosphatase   Date Value Ref Range Status   04/01/2024 60 40 - 150 U/L Final     Comment:     Reference intervals for this test were updated on 11/14/2023 to more accurately reflect our healthy population. There may be differences in the flagging of prior results with similar values performed with this method. Interpretation of those prior results can be made in the context of the updated reference intervals.     Creatinine   Date Value Ref Range Status   09/30/2024 0.75 0.51 - 0.95 mg/dL Final     GFR Estimate   Date Value Ref Range Status   09/30/2024 >90 >60 mL/min/1.73m2 Final     Comment:     eGFR calculated using 2021 CKD-EPI equation.   06/07/2021 >60 >60 mL/min/1.73m2 Final     GFR Estimate If Black   Date Value Ref Range Status   06/07/2021 >60 >60 mL/min/1.73m2 Final

## 2025-01-14 DIAGNOSIS — M77.9 ENTHESITIS: ICD-10-CM

## 2025-01-14 DIAGNOSIS — L40.50 PSORIATIC ARTHRITIS (H): ICD-10-CM

## 2025-01-14 RX ORDER — ADALIMUMAB-RYVK 40MG/0.4ML
40 KIT SUBCUTANEOUS
Qty: 2.4 ML | Refills: 0 | Status: SHIPPED | OUTPATIENT
Start: 2025-01-14 | End: 2025-04-14

## 2025-01-30 ENCOUNTER — LAB (OUTPATIENT)
Dept: LAB | Facility: CLINIC | Age: 55
End: 2025-01-30
Payer: COMMERCIAL

## 2025-01-30 DIAGNOSIS — L40.50 PSORIATIC ARTHRITIS (H): ICD-10-CM

## 2025-01-30 LAB
ERYTHROCYTE [DISTWIDTH] IN BLOOD BY AUTOMATED COUNT: 14.6 % (ref 10–15)
HCT VFR BLD AUTO: 36.1 % (ref 35–47)
HGB BLD-MCNC: 12 G/DL (ref 11.7–15.7)
MCH RBC QN AUTO: 29.8 PG (ref 26.5–33)
MCHC RBC AUTO-ENTMCNC: 33.2 G/DL (ref 31.5–36.5)
MCV RBC AUTO: 90 FL (ref 78–100)
PLATELET # BLD AUTO: 304 10E3/UL (ref 150–450)
RBC # BLD AUTO: 4.03 10E6/UL (ref 3.8–5.2)
WBC # BLD AUTO: 7.2 10E3/UL (ref 4–11)

## 2025-03-05 ENCOUNTER — TELEPHONE (OUTPATIENT)
Dept: RHEUMATOLOGY | Facility: CLINIC | Age: 55
End: 2025-03-05
Payer: COMMERCIAL

## 2025-03-05 NOTE — TELEPHONE ENCOUNTER
Prior Authorization Approval    Medication: SIMLANDI (2 PEN) 40 MG/0.4ML SC AJKT  Authorization Effective Date: 2/3/2025  Authorization Expiration Date: 3/5/2026  Approved Dose/Quantity: 2 per 28 days  Reference #: [id number]   Insurance Company: [employer] - Phone [phone] Fax [phone]  Expected CoPay: $    CoPay Card Available:      Financial Assistance Needed: Unknown  Which Pharmacy is filling the prescription: [address]  Pharmacy Notified: Not needed  Patient Notified: Not needed          PA Initiation    Medication: SIMLANDI (2 PEN) 40 MG/0.4ML SC AJKT  Insurance Company: [employer] - Phone [phone] Fax [phone]  Pharmacy Filling the Rx: [address]  Filling Pharmacy Phone:    Filling Pharmacy Fax:    Start Date: 3/5/2025

## 2025-03-18 DIAGNOSIS — L40.50 PSORIATIC ARTHRITIS (H): ICD-10-CM

## 2025-03-18 DIAGNOSIS — M77.9 ENTHESITIS: ICD-10-CM

## 2025-03-18 RX ORDER — METHOTREXATE 2.5 MG/1
25 TABLET ORAL
Qty: 128 TABLET | Refills: 0 | Status: SHIPPED | OUTPATIENT
Start: 2025-03-18

## 2025-04-06 DIAGNOSIS — L40.50 PSORIATIC ARTHRITIS (H): ICD-10-CM

## 2025-04-06 DIAGNOSIS — M77.9 ENTHESITIS: ICD-10-CM

## 2025-04-07 RX ORDER — ADALIMUMAB-RYVK 40MG/0.4ML
KIT SUBCUTANEOUS
Qty: 2.4 ML | Refills: 0 | Status: SHIPPED | OUTPATIENT
Start: 2025-04-07

## 2025-04-14 DIAGNOSIS — Z79.899 HIGH RISK MEDICATION USE: ICD-10-CM

## 2025-04-14 RX ORDER — FOLIC ACID 1 MG/1
1000 TABLET ORAL DAILY
Qty: 90 TABLET | Refills: 0 | Status: SHIPPED | OUTPATIENT
Start: 2025-04-14

## 2025-04-21 ENCOUNTER — LAB REQUISITION (OUTPATIENT)
Dept: LAB | Facility: CLINIC | Age: 55
End: 2025-04-21

## 2025-04-21 DIAGNOSIS — Z13.1 ENCOUNTER FOR SCREENING FOR DIABETES MELLITUS: ICD-10-CM

## 2025-04-21 DIAGNOSIS — Z13.220 ENCOUNTER FOR SCREENING FOR LIPOID DISORDERS: ICD-10-CM

## 2025-04-21 LAB
EST. AVERAGE GLUCOSE BLD GHB EST-MCNC: 105 MG/DL
HBA1C MFR BLD: 5.3 %

## 2025-04-21 PROCEDURE — 83036 HEMOGLOBIN GLYCOSYLATED A1C: CPT | Performed by: OBSTETRICS & GYNECOLOGY

## 2025-04-21 PROCEDURE — 80061 LIPID PANEL: CPT | Performed by: OBSTETRICS & GYNECOLOGY

## 2025-04-21 PROCEDURE — 84155 ASSAY OF PROTEIN SERUM: CPT | Performed by: OBSTETRICS & GYNECOLOGY

## 2025-04-21 PROCEDURE — 82947 ASSAY GLUCOSE BLOOD QUANT: CPT | Performed by: OBSTETRICS & GYNECOLOGY

## 2025-04-22 LAB
ALBUMIN SERPL BCG-MCNC: 4.4 G/DL (ref 3.5–5.2)
ALP SERPL-CCNC: 66 U/L (ref 40–150)
ALT SERPL W P-5'-P-CCNC: 29 U/L (ref 0–50)
ANION GAP SERPL CALCULATED.3IONS-SCNC: 9 MMOL/L (ref 7–15)
AST SERPL W P-5'-P-CCNC: 30 U/L (ref 0–45)
BILIRUB SERPL-MCNC: 0.2 MG/DL
BUN SERPL-MCNC: 7.9 MG/DL (ref 6–20)
CALCIUM SERPL-MCNC: 9.2 MG/DL (ref 8.8–10.4)
CHLORIDE SERPL-SCNC: 102 MMOL/L (ref 98–107)
CHOLEST SERPL-MCNC: 216 MG/DL
CREAT SERPL-MCNC: 0.67 MG/DL (ref 0.51–0.95)
EGFRCR SERPLBLD CKD-EPI 2021: >90 ML/MIN/1.73M2
FASTING STATUS PATIENT QL REPORTED: NO
GLUCOSE SERPL-MCNC: 89 MG/DL (ref 70–99)
HCO3 SERPL-SCNC: 28 MMOL/L (ref 22–29)
HDLC SERPL-MCNC: 57 MG/DL
LDLC SERPL CALC-MCNC: 120 MG/DL
NONHDLC SERPL-MCNC: 159 MG/DL
POTASSIUM SERPL-SCNC: 4.5 MMOL/L (ref 3.4–5.3)
PROT SERPL-MCNC: 7 G/DL (ref 6.4–8.3)
SODIUM SERPL-SCNC: 139 MMOL/L (ref 135–145)
TRIGL SERPL-MCNC: 193 MG/DL

## 2025-04-24 ENCOUNTER — LAB (OUTPATIENT)
Dept: LAB | Facility: CLINIC | Age: 55
End: 2025-04-24
Payer: COMMERCIAL

## 2025-04-24 DIAGNOSIS — L40.50 PSORIATIC ARTHRITIS (H): ICD-10-CM

## 2025-04-24 LAB
ERYTHROCYTE [DISTWIDTH] IN BLOOD BY AUTOMATED COUNT: 14.5 % (ref 10–15)
HCT VFR BLD AUTO: 35.8 % (ref 35–47)
HGB BLD-MCNC: 11.9 G/DL (ref 11.7–15.7)
MCH RBC QN AUTO: 29.9 PG (ref 26.5–33)
MCHC RBC AUTO-ENTMCNC: 33.2 G/DL (ref 31.5–36.5)
MCV RBC AUTO: 90 FL (ref 78–100)
PLATELET # BLD AUTO: 344 10E3/UL (ref 150–450)
RBC # BLD AUTO: 3.98 10E6/UL (ref 3.8–5.2)
WBC # BLD AUTO: 6.6 10E3/UL (ref 4–11)

## 2025-05-18 ENCOUNTER — HEALTH MAINTENANCE LETTER (OUTPATIENT)
Age: 55
End: 2025-05-18

## 2025-06-09 ENCOUNTER — OFFICE VISIT (OUTPATIENT)
Dept: RHEUMATOLOGY | Facility: CLINIC | Age: 55
End: 2025-06-09
Payer: COMMERCIAL

## 2025-06-09 VITALS
OXYGEN SATURATION: 99 % | HEART RATE: 83 BPM | BODY MASS INDEX: 31.31 KG/M2 | SYSTOLIC BLOOD PRESSURE: 130 MMHG | WEIGHT: 202.9 LBS | DIASTOLIC BLOOD PRESSURE: 81 MMHG

## 2025-06-09 DIAGNOSIS — L40.50 PSORIATIC ARTHRITIS (H): Primary | ICD-10-CM

## 2025-06-09 DIAGNOSIS — Z79.899 HIGH RISK MEDICATION USE: ICD-10-CM

## 2025-06-09 DIAGNOSIS — M77.9 ENTHESITIS: ICD-10-CM

## 2025-06-09 PROCEDURE — 3075F SYST BP GE 130 - 139MM HG: CPT | Performed by: INTERNAL MEDICINE

## 2025-06-09 PROCEDURE — 99214 OFFICE O/P EST MOD 30 MIN: CPT | Performed by: INTERNAL MEDICINE

## 2025-06-09 PROCEDURE — G2211 COMPLEX E/M VISIT ADD ON: HCPCS | Performed by: INTERNAL MEDICINE

## 2025-06-09 PROCEDURE — 3079F DIAST BP 80-89 MM HG: CPT | Performed by: INTERNAL MEDICINE

## 2025-06-09 RX ORDER — FOLIC ACID 1 MG/1
1000 TABLET ORAL DAILY
Qty: 90 TABLET | Refills: 0 | Status: SHIPPED | OUTPATIENT
Start: 2025-06-09

## 2025-06-09 RX ORDER — METHOTREXATE 2.5 MG/1
25 TABLET ORAL
Qty: 128 TABLET | Refills: 0 | Status: SHIPPED | OUTPATIENT
Start: 2025-06-09

## 2025-06-09 RX ORDER — ROSUVASTATIN CALCIUM 5 MG/1
1 TABLET, COATED ORAL
COMMUNITY
Start: 2025-06-02

## 2025-06-09 NOTE — PROGRESS NOTES
"      Rheumatology follow-up visit note     Essence is a 54 year old female presents today for follow-up.    Essence was seen today for recheck.    Diagnoses and all orders for this visit:    Psoriatic arthritis (H)  -     methotrexate 2.5 MG tablet; Take 10 tablets (25 mg) by mouth every 7 days.    Enthesitis  -     methotrexate 2.5 MG tablet; Take 10 tablets (25 mg) by mouth every 7 days.    High risk medication use  -     folic acid (FOLVITE) 1 MG tablet; Take 1 tablet (1,000 mcg) by mouth daily.        The longitudinal plan of care for the diagnosis(es)/condition(s) as documented were addressed during this visit. Due to the added complexity in care, I will continue to support Essence in the subsequent management and with ongoing continuity of care.      Follow up in 5 months.  Labs toward the end of July and prior to next visit.    HPI    Essence Rubio is a 54 year old female is here for follow-up of   psoriatic arthritis, in view of the sisters history of psoriasis, she herself has had inflammatory joint disease, enthesitis on methotrexate with folic acid and Humira now the biosimilar Simlandi, .  On her previous visit she was started on duloxetine in addition.  She feels so much better overall.  Her trochanteric area pain has all but disappeared.  Toward the end of the day some days finishing her daily chores, washing dishes she experienced some discomfort in the fingers.  Otherwise she is doing so much better.  Her morning stiffness is no more than 15 minutes.  Her most recent labs were done end of April.  Will recheck end of July and before next visit in 5 months.  This is to help reestablish the \"sink\" of labs and visits..   Recently in the process of changing their homes there was some break in getting the Humira that resulted in worsening symptoms out of subsided since now that she is back on it.   DETAILED EXAMINATION  06/09/25  :    Vitals:    06/09/25 1133   BP: 130/81   BP Location: Right arm "   Patient Position: Sitting   Cuff Size: Adult Large   Pulse: 83   SpO2: 99%   Weight: 92 kg (202 lb 14.4 oz)     Alert oriented. Head including the face is examined for malar rash, heliotropes, scarring, lupus pernio. Eyes examined for redness such as in episcleritis/scleritis, periorbital lesions.   Neck/ Face examined for parotid gland swelling, range of motion of neck.  Left upper and lower and right upper and lower extremities examined for tenderness, swelling, warmth of the appendicular joints, range of motion, edema, rash.  Some of the important findings included: she does not have evidence of synovitis in the palpable joints of the upper extremities.  No significant deformities of the digits.  no Heberden nodes.  Range of motion of the shoulders  show full abduction.  No JLT effusion or warmth of the knees.  she does not have dactylitis of the digits.     Patient Active Problem List    Diagnosis Date Noted    Polymyalgia 2022     Priority: Medium    1st MTP arthritis 2019     Priority: Medium    Enthesitis -knee 2018     Priority: Medium    Psoriatic arthritis (H) 2018     Priority: Medium    Piriformis syndrome, unspecified laterality 10/17/2017     Priority: Medium    Family history of psoriasis in sister 2017     Priority: Medium    Acute bilateral low back pain with sciatica 2017     Priority: Medium    Trochanteric bursitis of both hips 2016     Priority: Medium    S/P vaginal hysterectomy 2016     Priority: Medium    High risk medication use 2016     Priority: Medium    Gallstone 09/10/2015     Priority: Medium     Past Surgical History:   Procedure Laterality Date    ABDOMEN SURGERY      lap for endometriosis    ANKLE ARTHROPLASTY      ankle surgery; pt states she has plates, no pins.     SECTION      HYSTERECTOMY  2016    LAPAROSCOPIC CHOLECYSTECTOMY N/A 2015    Procedure: CHOLECYSTECTOMY LAPAROSCOPIC;  Surgeon: Nithin PANG MD;   Location: [facility] OR;  Service:     OOPHORECTOMY  05/2019    OTHER SURGICAL HISTORY  07/13/2016    Laparoscopic vaginal hysterectomy, bilateral salpingectomyfailed ablation / [facility] / [facility]     TONSILLECTOMY        Past Medical History:   Diagnosis Date    Asthma     aggravated with colds    History of anesthesia complications     PONV (postoperative nausea and vomiting)     Rheumatoid arthritis (H)      Allergies   Allergen Reactions    Penicillins Anaphylaxis    Penicillin V Potassium [Penicillin V] Other (See Comments)     Current Outpatient Medications   Medication Sig Dispense Refill    adalimumab-ryvk, 2 Pen, (SIMLANDI) 40 MG/0.4ML auto-injector kit INJECT 40 MG (0.4 ML) UNDER THE SKIN EVERY 14 DAYS 2.4 mL 0    aspirin 81 MG EC tablet [ASPIRIN 81 MG EC TABLET] Take 81 mg by mouth daily.      cetirizine (ZYRTEC) 10 MG tablet [CETIRIZINE (ZYRTEC) 10 MG TABLET] Take 10 mg by mouth daily.      DULoxetine (CYMBALTA) 30 MG capsule TAKE 1 CAPSULE DAILY 90 capsule 3    estradiol (ESTRACE) 2 MG tablet [ESTRADIOL (ESTRACE) 2 MG TABLET] Take 2 mg by mouth daily.             folic acid (FOLVITE) 1 MG tablet TAKE 1 TABLET DAILY 90 tablet 0    methotrexate 2.5 MG tablet Take 10 tablets (25 mg) by mouth every 7 days. 128 tablet 0    vitamin D3 25 mcg (1000 units) tablet Take 2 tablets by mouth daily (Patient not taking: Reported on 10/30/2024)       family history includes Cancer in her sister; Diabetes in her maternal grandmother and mother; Hyperlipidemia in her father; Hypertension in her father; Rheumatoid Arthritis in her mother.  Social Connections: Not on file          WBC Count   Date Value Ref Range Status   04/24/2025 6.6 4.0 - 11.0 10e3/uL Final     RBC Count   Date Value Ref Range Status   04/24/2025 3.98 3.80 - 5.20 10e6/uL Final     Hemoglobin   Date Value Ref Range Status   04/24/2025 11.9 11.7 - 15.7 g/dL Final     Hematocrit   Date Value Ref Range Status   04/24/2025 35.8 35.0 - 47.0 % Final      MCV   Date Value Ref Range Status   04/24/2025 90 78 - 100 fL Final     MCH   Date Value Ref Range Status   04/24/2025 29.9 26.5 - 33.0 pg Final     Platelet Count   Date Value Ref Range Status   04/24/2025 344 150 - 450 10e3/uL Final     AST   Date Value Ref Range Status   04/21/2025 30 0 - 45 U/L Final     ALT   Date Value Ref Range Status   04/24/2025 21 0 - 50 U/L Final     Albumin   Date Value Ref Range Status   04/24/2025 4.2 3.5 - 5.2 g/dL Final   06/14/2022 3.8 3.5 - 5.0 g/dL Final     Alkaline Phosphatase   Date Value Ref Range Status   04/21/2025 66 40 - 150 U/L Final     Creatinine   Date Value Ref Range Status   04/24/2025 0.69 0.51 - 0.95 mg/dL Final     GFR Estimate   Date Value Ref Range Status   04/24/2025 >90 >60 mL/min/1.73m2 Final     Comment:     eGFR calculated using 2021 CKD-EPI equation.   06/07/2021 >60 >60 mL/min/1.73m2 Final     GFR Estimate If Black   Date Value Ref Range Status   06/07/2021 >60 >60 mL/min/1.73m2 Final

## 2025-07-24 ENCOUNTER — LAB (OUTPATIENT)
Dept: LAB | Facility: CLINIC | Age: 55
End: 2025-07-24
Payer: COMMERCIAL

## 2025-07-24 DIAGNOSIS — L40.50 PSORIATIC ARTHRITIS (H): ICD-10-CM

## 2025-07-24 LAB
ALBUMIN SERPL BCG-MCNC: 4.2 G/DL (ref 3.5–5.2)
ALT SERPL W P-5'-P-CCNC: 22 U/L (ref 0–50)
CREAT SERPL-MCNC: 0.63 MG/DL (ref 0.51–0.95)
EGFRCR SERPLBLD CKD-EPI 2021: >90 ML/MIN/1.73M2
ERYTHROCYTE [DISTWIDTH] IN BLOOD BY AUTOMATED COUNT: 15.2 % (ref 10–15)
HCT VFR BLD AUTO: 39.2 % (ref 35–47)
HGB BLD-MCNC: 12.9 G/DL (ref 11.7–15.7)
MCH RBC QN AUTO: 30 PG (ref 26.5–33)
MCHC RBC AUTO-ENTMCNC: 32.9 G/DL (ref 31.5–36.5)
MCV RBC AUTO: 91 FL (ref 78–100)
PLATELET # BLD AUTO: 290 10E3/UL (ref 150–450)
RBC # BLD AUTO: 4.3 10E6/UL (ref 3.8–5.2)
WBC # BLD AUTO: 6 10E3/UL (ref 4–11)

## 2025-08-15 ENCOUNTER — ANCILLARY PROCEDURE (OUTPATIENT)
Dept: MAMMOGRAPHY | Facility: CLINIC | Age: 55
End: 2025-08-15
Attending: STUDENT IN AN ORGANIZED HEALTH CARE EDUCATION/TRAINING PROGRAM
Payer: COMMERCIAL

## 2025-08-15 DIAGNOSIS — Z12.31 VISIT FOR SCREENING MAMMOGRAM: ICD-10-CM

## 2025-08-15 PROCEDURE — 77063 BREAST TOMOSYNTHESIS BI: CPT | Mod: TC | Performed by: RADIOLOGY

## 2025-08-15 PROCEDURE — 77067 SCR MAMMO BI INCL CAD: CPT | Mod: TC | Performed by: RADIOLOGY
